# Patient Record
Sex: FEMALE | Race: ASIAN | NOT HISPANIC OR LATINO | Employment: FULL TIME | URBAN - METROPOLITAN AREA
[De-identification: names, ages, dates, MRNs, and addresses within clinical notes are randomized per-mention and may not be internally consistent; named-entity substitution may affect disease eponyms.]

---

## 2021-09-13 ENCOUNTER — OFFICE VISIT (OUTPATIENT)
Dept: MEDICAL GROUP | Age: 51
End: 2021-09-13
Payer: COMMERCIAL

## 2021-09-13 VITALS
BODY MASS INDEX: 23.73 KG/M2 | TEMPERATURE: 97.9 F | OXYGEN SATURATION: 97 % | DIASTOLIC BLOOD PRESSURE: 62 MMHG | WEIGHT: 139 LBS | HEART RATE: 67 BPM | SYSTOLIC BLOOD PRESSURE: 102 MMHG | HEIGHT: 64 IN

## 2021-09-13 DIAGNOSIS — Z23 NEED FOR VACCINATION: ICD-10-CM

## 2021-09-13 DIAGNOSIS — Z76.89 ENCOUNTER TO ESTABLISH CARE WITH NEW DOCTOR: ICD-10-CM

## 2021-09-13 DIAGNOSIS — E55.9 VITAMIN D INSUFFICIENCY: ICD-10-CM

## 2021-09-13 DIAGNOSIS — Z00.00 PE (PHYSICAL EXAM), ANNUAL: ICD-10-CM

## 2021-09-13 DIAGNOSIS — Z12.31 ENCOUNTER FOR SCREENING MAMMOGRAM FOR BREAST CANCER: ICD-10-CM

## 2021-09-13 DIAGNOSIS — Z83.3 FHX: DIABETES MELLITUS: ICD-10-CM

## 2021-09-13 DIAGNOSIS — N83.201 CYST OF RIGHT OVARY: ICD-10-CM

## 2021-09-13 DIAGNOSIS — Z12.12 SCREENING FOR COLORECTAL CANCER: ICD-10-CM

## 2021-09-13 DIAGNOSIS — Z12.11 SCREENING FOR COLORECTAL CANCER: ICD-10-CM

## 2021-09-13 DIAGNOSIS — M79.7 FIBROMYALGIA: ICD-10-CM

## 2021-09-13 PROCEDURE — 99386 PREV VISIT NEW AGE 40-64: CPT | Mod: 25 | Performed by: FAMILY MEDICINE

## 2021-09-13 PROCEDURE — 90715 TDAP VACCINE 7 YRS/> IM: CPT | Performed by: FAMILY MEDICINE

## 2021-09-13 PROCEDURE — 90471 IMMUNIZATION ADMIN: CPT | Performed by: FAMILY MEDICINE

## 2021-09-13 ASSESSMENT — PATIENT HEALTH QUESTIONNAIRE - PHQ9: CLINICAL INTERPRETATION OF PHQ2 SCORE: 0

## 2021-09-14 NOTE — PROGRESS NOTES
Subjective:   CC: establish care, annual PE     HPI:     Rory Barajas is a 51 y.o. female, established patient of the clinic.     Patient is healthy 51-year-old female with no major medical or surgical history.  Patient is , sexually active, has 1 adult son.  Patient works as a .  She does not take any medications.  Negative history of drugs, alcohol, tobacco abuse.  Patient is due for mammogram, Pap smear, colon cancer screening and a few vaccine.    Patient states that she will found to have right ovarian cyst in the past.  She does have intermittent pain at the right lower pelvic area without dyspareunia, abnormal vaginal bleeding/discharge, familial history of gynecologic malignancy.  Patient would like to have repeat ultrasound for reassessment.    Family history is positive for type 2 diabetes.  She is taking vitamin D supplement for vitamin D insufficiency.    She was diagnosed with fibromyalgia in the past and was treated with Cymbalta and Lyrica.  However, patient discontinued both medication for several years due to increased mood irritation, agitation, anger issue associated with medication.  Her symptoms are manageable with regular exercise and activity modification at this time.  She is also taking over-the-counter as needed for pain.    Current medicines (including changes today)  No current outpatient medications on file.     No current facility-administered medications for this visit.     She  has no past medical history on file.    I personally reviewed patient's problem list, allergies, medications, family hx, social hx with patient and update EPIC.     REVIEW OF SYSTEMS:  CONSTITUTIONAL:  Denies night sweats, fatigue, malaise, lethargy, fever or chills.  RESPIRATORY:  Denies cough, wheeze, hemoptysis, or shortness of breath.  CARDIOVASCULAR:  Denies chest pains, palpitations, pedal edema     Objective:     /62 (BP Location: Right arm, Patient Position: Sitting, BP Cuff Size:  "Adult)   Pulse 67   Temp 36.6 °C (97.9 °F) (Temporal)   Ht 1.626 m (5' 4\")   Wt 63 kg (139 lb)   SpO2 97%  Body mass index is 23.86 kg/m².    Physical Exam:  Constitutional: awake, alert, in no distress.  Skin: Warm, dry, good turgor, no rashes, bruises, ulcers in visible areas.  Eye: conjunctiva clear, lids neg for edema or lesions.  ENMT: TM and auditory canals wnl.    Neck: Trachea midline, no masses, no thyromegaly. No cervical or supraclavicular lymphadenopathy  Respiratory: Unlabored respiratory effort, lungs clear to auscultation, no wheezes, no rales.  Cardiovascular: Normal S1, S2, no murmur, no pedal edema.  Abdomen: Soft, non-tender to palpation, active BS, no hernia, no hepatosplenomegaly, negative rebound or guarding.   Psych: Oriented x3, affect and mood wnl, intact judgement and insight.       Assessment and Plan:   The following treatment plan was discussed    1. Cyst of right ovary  - US-PELVIC TRANSVAGINAL ONLY; Future    2. Fibromyalgia  Chronic, controlled with behavioral modification and over-the-counter analgesic as needed for pain.  Historically, she was treated with Cymbalta and Lyrica.  Unfortunately she had to discontinue both medication due to increasing mental side effect.  -Exercises, stress reduction, over-the-counter analgesics as needed for pain    3. FHx: diabetes mellitus  We will screen for diabetes    4. Vitamin D insufficiency  -Continue 2000 units of vitamin D daily  - VITAMIN D,25 HYDROXY; Future    5. Need for vaccination  - Tdap Vaccine =>6YO IM    6. Encounter for screening mammogram for breast cancer  - MA-SCREENING MAMMO BILAT W/CAD; Future    7. Screening for colorectal cancer  - REFERRAL TO GI FOR COLONOSCOPY    8. PE (physical exam), annual  9. Encounter to establish care with new doctor  General health and wellness counseling provided.      Vaccines counseling provided  - CBC WITH DIFFERENTIAL; Future  - Comp Metabolic Panel; Future  - HEMOGLOBIN A1C; Future  - " Lipid Profile; Future      Ly YESENIA Foster M.D.      Followup: Return for Pap, Labs review.    Please note that this dictation was created using voice recognition software. I have made every reasonable attempt to correct obvious errors, but I expect that there are errors of grammar and possibly content that I did not discover before finalizing the note.

## 2021-11-08 ENCOUNTER — APPOINTMENT (OUTPATIENT)
Dept: MEDICAL GROUP | Age: 51
End: 2021-11-08
Payer: COMMERCIAL

## 2021-11-29 ENCOUNTER — HOSPITAL ENCOUNTER (OUTPATIENT)
Dept: RADIOLOGY | Facility: MEDICAL CENTER | Age: 51
End: 2021-11-29
Attending: FAMILY MEDICINE
Payer: COMMERCIAL

## 2021-11-29 ENCOUNTER — HOSPITAL ENCOUNTER (OUTPATIENT)
Dept: LAB | Facility: MEDICAL CENTER | Age: 51
End: 2021-11-29
Attending: FAMILY MEDICINE
Payer: COMMERCIAL

## 2021-11-29 DIAGNOSIS — Z00.00 PE (PHYSICAL EXAM), ANNUAL: ICD-10-CM

## 2021-11-29 DIAGNOSIS — E55.9 VITAMIN D INSUFFICIENCY: ICD-10-CM

## 2021-11-29 DIAGNOSIS — Z12.31 ENCOUNTER FOR SCREENING MAMMOGRAM FOR BREAST CANCER: ICD-10-CM

## 2021-11-29 DIAGNOSIS — N83.201 CYST OF RIGHT OVARY: ICD-10-CM

## 2021-11-29 LAB
ALBUMIN SERPL BCP-MCNC: 4.6 G/DL (ref 3.2–4.9)
ALBUMIN/GLOB SERPL: 1.5 G/DL
ALP SERPL-CCNC: 95 U/L (ref 30–99)
ALT SERPL-CCNC: 19 U/L (ref 2–50)
ANION GAP SERPL CALC-SCNC: 9 MMOL/L (ref 7–16)
AST SERPL-CCNC: 17 U/L (ref 12–45)
BASOPHILS # BLD AUTO: 0.3 % (ref 0–1.8)
BASOPHILS # BLD: 0.02 K/UL (ref 0–0.12)
BILIRUB SERPL-MCNC: 0.6 MG/DL (ref 0.1–1.5)
BUN SERPL-MCNC: 12 MG/DL (ref 8–22)
CALCIUM SERPL-MCNC: 9.7 MG/DL (ref 8.5–10.5)
CHLORIDE SERPL-SCNC: 106 MMOL/L (ref 96–112)
CHOLEST SERPL-MCNC: 265 MG/DL (ref 100–199)
CO2 SERPL-SCNC: 26 MMOL/L (ref 20–33)
CREAT SERPL-MCNC: 0.64 MG/DL (ref 0.5–1.4)
EOSINOPHIL # BLD AUTO: 0.11 K/UL (ref 0–0.51)
EOSINOPHIL NFR BLD: 1.5 % (ref 0–6.9)
ERYTHROCYTE [DISTWIDTH] IN BLOOD BY AUTOMATED COUNT: 50.3 FL (ref 35.9–50)
EST. AVERAGE GLUCOSE BLD GHB EST-MCNC: 134 MG/DL
FASTING STATUS PATIENT QL REPORTED: NORMAL
GLOBULIN SER CALC-MCNC: 3 G/DL (ref 1.9–3.5)
GLUCOSE SERPL-MCNC: 111 MG/DL (ref 65–99)
HBA1C MFR BLD: 6.3 % (ref 4–5.6)
HCT VFR BLD AUTO: 43.9 % (ref 37–47)
HDLC SERPL-MCNC: 87 MG/DL
HGB BLD-MCNC: 13.8 G/DL (ref 12–16)
IMM GRANULOCYTES # BLD AUTO: 0.01 K/UL (ref 0–0.11)
IMM GRANULOCYTES NFR BLD AUTO: 0.1 % (ref 0–0.9)
LDLC SERPL CALC-MCNC: 167 MG/DL
LYMPHOCYTES # BLD AUTO: 2.41 K/UL (ref 1–4.8)
LYMPHOCYTES NFR BLD: 32.3 % (ref 22–41)
MCH RBC QN AUTO: 26.4 PG (ref 27–33)
MCHC RBC AUTO-ENTMCNC: 31.4 G/DL (ref 33.6–35)
MCV RBC AUTO: 84.1 FL (ref 81.4–97.8)
MONOCYTES # BLD AUTO: 0.35 K/UL (ref 0–0.85)
MONOCYTES NFR BLD AUTO: 4.7 % (ref 0–13.4)
NEUTROPHILS # BLD AUTO: 4.57 K/UL (ref 2–7.15)
NEUTROPHILS NFR BLD: 61.1 % (ref 44–72)
NRBC # BLD AUTO: 0 K/UL
NRBC BLD-RTO: 0 /100 WBC
PLATELET # BLD AUTO: 290 K/UL (ref 164–446)
PMV BLD AUTO: 9.5 FL (ref 9–12.9)
POTASSIUM SERPL-SCNC: 4.5 MMOL/L (ref 3.6–5.5)
PROT SERPL-MCNC: 7.6 G/DL (ref 6–8.2)
RBC # BLD AUTO: 5.22 M/UL (ref 4.2–5.4)
SODIUM SERPL-SCNC: 141 MMOL/L (ref 135–145)
TRIGL SERPL-MCNC: 55 MG/DL (ref 0–149)
WBC # BLD AUTO: 7.5 K/UL (ref 4.8–10.8)

## 2021-11-29 PROCEDURE — 82306 VITAMIN D 25 HYDROXY: CPT

## 2021-11-29 PROCEDURE — 80053 COMPREHEN METABOLIC PANEL: CPT

## 2021-11-29 PROCEDURE — 85025 COMPLETE CBC W/AUTO DIFF WBC: CPT

## 2021-11-29 PROCEDURE — 77063 BREAST TOMOSYNTHESIS BI: CPT

## 2021-11-29 PROCEDURE — 76830 TRANSVAGINAL US NON-OB: CPT

## 2021-11-29 PROCEDURE — 80061 LIPID PANEL: CPT

## 2021-11-29 PROCEDURE — 36415 COLL VENOUS BLD VENIPUNCTURE: CPT

## 2021-11-29 PROCEDURE — 83036 HEMOGLOBIN GLYCOSYLATED A1C: CPT

## 2021-12-01 LAB — 25(OH)D3 SERPL-MCNC: 18 NG/ML (ref 30–80)

## 2021-12-06 ENCOUNTER — OFFICE VISIT (OUTPATIENT)
Dept: MEDICAL GROUP | Age: 51
End: 2021-12-06
Payer: COMMERCIAL

## 2021-12-06 VITALS
OXYGEN SATURATION: 96 % | WEIGHT: 146 LBS | HEIGHT: 63 IN | TEMPERATURE: 97.1 F | DIASTOLIC BLOOD PRESSURE: 60 MMHG | BODY MASS INDEX: 25.87 KG/M2 | SYSTOLIC BLOOD PRESSURE: 100 MMHG | HEART RATE: 66 BPM | RESPIRATION RATE: 12 BRPM

## 2021-12-06 DIAGNOSIS — E55.9 VITAMIN D DEFICIENCY: ICD-10-CM

## 2021-12-06 DIAGNOSIS — E78.00 PURE HYPERCHOLESTEROLEMIA: ICD-10-CM

## 2021-12-06 DIAGNOSIS — Z01.83 ENCOUNTER FOR BLOOD TYPING: ICD-10-CM

## 2021-12-06 DIAGNOSIS — R73.03 PREDIABETES: ICD-10-CM

## 2021-12-06 PROCEDURE — 99214 OFFICE O/P EST MOD 30 MIN: CPT | Performed by: FAMILY MEDICINE

## 2021-12-06 ASSESSMENT — FIBROSIS 4 INDEX: FIB4 SCORE: 0.69

## 2021-12-06 NOTE — PROGRESS NOTES
"Subjective:   CC: hyperlipidemia     HPI:     Rory Barajas is a 51 y.o. female, established patient of the clinic.     Patient is overall healthy, no major medical or surgical history.  Her most recent labs showed A1c of 6.3 and elevated total cholesterol and LDL.  She was also found to have vitamin D deficiency.  She currently does not take any medication including supplements or vitamins.  Patient states that her diet is generally poor. She consumes red meat, eggs, and sweets regularly.  She is active, but does not exercise regularly.  Patient has been gaining more weight as she is getting older.  Negative familial history of diabetes or hyperlipidemia.    Current medicines (including changes today)  No current outpatient medications on file.     No current facility-administered medications for this visit.     She  has no past medical history on file.    I reviewed patient's problem list, allergies, medications, family hx, social hx with patient and update EPIC.        Objective:     /60 (BP Location: Left arm, Patient Position: Sitting, BP Cuff Size: Adult)   Pulse 66   Temp 36.2 °C (97.1 °F) (Temporal)   Resp 12   Ht 1.6 m (5' 3\")   Wt 66.2 kg (146 lb)   SpO2 96%  Body mass index is 25.86 kg/m².    Physical Exam:  Constitutional: awake, alert, in no distress.  Skin: Warm, dry, good turgor, no rashes, bruises, ulcers in visible areas.  Eye: conjunctiva clear, lids neg for edema or lesions.  Neck: Trachea midline, no masses, no thyromegaly. No cervical or supraclavicular lymphadenopathy  Respiratory: Unlabored respiratory effort, lungs clear to auscultation, no wheezes, no rales.  Cardiovascular: Normal S1, S2, no murmur, no pedal edema.  Psych: Oriented x3, affect and mood wnl, intact judgement and insight.       Assessment and Plan:   The following treatment plan was discussed    1. Pure hypercholesterolemia  The 10-year ASCVD risk score (Kaylee DC Jr., et al., 2013) is: 0.7%    Values used to calculate the " score:      Age: 51 years      Sex: Female      Is Non- : No      Diabetic: No      Tobacco smoker: No      Systolic Blood Pressure: 100 mmHg      Is BP treated: No      HDL Cholesterol: 87 mg/dL      Total Cholesterol: 265 mg/dL   - dietary modification, exercise, and weight loss.   - avoid alcohol, drugs, tobacco products   - Lipid Profile; Future  - Comp Metabolic Panel; Future  - follow up in 6 months     2. Prediabetes  Most recent A1C was 6.3.   - Dietary/lifestyle modification and weight loss    - HEMOGLOBIN A1C; Future    3. Encounter for blood typing  - ABO GROUPING (ONLY); Future    4. Vitamin D deficiency  - 5000 units of vitamin D daily   - VITAMIN D,25 HYDROXY; Future      Ly YESENIA Foster M.D.      Followup: Return in about 6 months (around 6/6/2022) for Pap.    Please note that this dictation was created using voice recognition software. I have made every reasonable attempt to correct obvious errors, but I expect that there are errors of grammar and possibly content that I did not discover before finalizing the note.

## 2021-12-07 ENCOUNTER — TELEPHONE (OUTPATIENT)
Dept: MEDICAL GROUP | Age: 51
End: 2021-12-07

## 2021-12-07 DIAGNOSIS — Z01.83 ENCOUNTER FOR BLOOD TYPING: ICD-10-CM

## 2021-12-08 NOTE — TELEPHONE ENCOUNTER
Patient can have ABO test done next year with the rest of her labs. She was previously informed.   Chey Foster M.D.

## 2022-05-24 LAB
ALBUMIN SERPL-MCNC: 4.7 G/DL (ref 3.8–4.9)
ALBUMIN/GLOB SERPL: 2 {RATIO} (ref 1.2–2.2)
ALP SERPL-CCNC: 57 IU/L (ref 44–121)
ALT SERPL-CCNC: 12 IU/L (ref 0–32)
AST SERPL-CCNC: 17 IU/L (ref 0–40)
BILIRUB SERPL-MCNC: 0.6 MG/DL (ref 0–1.2)
BUN SERPL-MCNC: 9 MG/DL (ref 6–24)
BUN/CREAT SERPL: 12 (ref 9–23)
CALCIUM SERPL-MCNC: 9.5 MG/DL (ref 8.7–10.2)
CHLORIDE SERPL-SCNC: 105 MMOL/L (ref 96–106)
CHOLEST SERPL-MCNC: 233 MG/DL (ref 100–199)
CO2 SERPL-SCNC: 22 MMOL/L (ref 20–29)
CREAT SERPL-MCNC: 0.75 MG/DL (ref 0.57–1)
EGFRCR SERPLBLD CKD-EPI 2021: 96 ML/MIN/1.73
GLOBULIN SER CALC-MCNC: 2.4 G/DL (ref 1.5–4.5)
GLUCOSE SERPL-MCNC: 93 MG/DL (ref 65–99)
HBA1C MFR BLD: 6 % (ref 4.8–5.6)
HDLC SERPL-MCNC: 78 MG/DL
LABORATORY COMMENT REPORT: ABNORMAL
LDLC SERPL CALC-MCNC: 146 MG/DL (ref 0–99)
POTASSIUM SERPL-SCNC: 4.2 MMOL/L (ref 3.5–5.2)
PROT SERPL-MCNC: 7.1 G/DL (ref 6–8.5)
SODIUM SERPL-SCNC: 143 MMOL/L (ref 134–144)
TRIGL SERPL-MCNC: 53 MG/DL (ref 0–149)
VLDLC SERPL CALC-MCNC: 9 MG/DL (ref 5–40)

## 2022-06-06 ENCOUNTER — OFFICE VISIT (OUTPATIENT)
Dept: MEDICAL GROUP | Age: 52
End: 2022-06-06
Payer: COMMERCIAL

## 2022-06-06 VITALS
HEIGHT: 63 IN | HEART RATE: 74 BPM | SYSTOLIC BLOOD PRESSURE: 100 MMHG | DIASTOLIC BLOOD PRESSURE: 64 MMHG | BODY MASS INDEX: 25.16 KG/M2 | OXYGEN SATURATION: 97 % | TEMPERATURE: 98 F | RESPIRATION RATE: 15 BRPM | WEIGHT: 142 LBS

## 2022-06-06 DIAGNOSIS — Z12.12 SCREENING FOR COLORECTAL CANCER: ICD-10-CM

## 2022-06-06 DIAGNOSIS — Z00.00 PE (PHYSICAL EXAM), ANNUAL: Primary | ICD-10-CM

## 2022-06-06 DIAGNOSIS — Z12.11 SCREENING FOR COLORECTAL CANCER: ICD-10-CM

## 2022-06-06 DIAGNOSIS — E78.00 PURE HYPERCHOLESTEROLEMIA: ICD-10-CM

## 2022-06-06 DIAGNOSIS — R73.03 PREDIABETES: ICD-10-CM

## 2022-06-06 PROCEDURE — 99396 PREV VISIT EST AGE 40-64: CPT | Performed by: FAMILY MEDICINE

## 2022-06-06 ASSESSMENT — PATIENT HEALTH QUESTIONNAIRE - PHQ9: CLINICAL INTERPRETATION OF PHQ2 SCORE: 0

## 2022-06-06 ASSESSMENT — FIBROSIS 4 INDEX: FIB4 SCORE: 0.88

## 2022-06-06 NOTE — PROGRESS NOTES
"Subjective:   CC: Annual physical    HPI:     Rory Barajsa is a 52 y.o. female, established patient of the clinic.     Patient has chronic prediabetes, hyperlipidemia.  She is not taking medication for these conditions.  She has been working on dietary and lifestyle modification.  She lost about 45 pounds in last office visit.  Her most recent labs showed A1c improvement from 6.3 to 6.0.  Her lipid profile is also slightly improved, but total cholesterol and LDL remain elevated.  She is active but does not exercise regularly.  Negative history of proximal, alcohol, tobacco abuse.    Current medicines (including changes today)  No current outpatient medications on file.     No current facility-administered medications for this visit.     She  has no past medical history on file.    I reviewed patient's problem list, allergies, medications, family hx, social hx with patient and update EPIC.        Objective:     /64 (BP Location: Right arm, Patient Position: Sitting, BP Cuff Size: Adult)   Pulse 74   Temp 36.7 °C (98 °F) (Temporal)   Resp 15   Ht 1.6 m (5' 3\")   Wt 64.4 kg (142 lb)   SpO2 97%  Body mass index is 25.15 kg/m².    Physical Exam:  Constitutional: awake, alert, in no distress.  Skin: Warm, dry, good turgor, no rashes, bruises, ulcers in visible areas.  Eye: conjunctiva clear, lids neg for edema or lesions.  Neck: Trachea midline, no masses, no thyromegaly. No cervical or supraclavicular lymphadenopathy  Respiratory: Unlabored respiratory effort, lungs clear to auscultation, no wheezes, no rales.  Cardiovascular: Normal S1, S2, no murmur, no pedal edema.  Psych: Oriented x3, affect and mood wnl, intact judgement and insight.       Assessment and Plan:   The following treatment plan was discussed    1. Pure hypercholesterolemia  The 10-year ASCVD risk score (Kaylee DC Jr., et al., 2013) is: 0.7%    Values used to calculate the score:      Age: 52 years      Sex: Female      Is Non-  " American: No      Diabetic: No      Tobacco smoker: No      Systolic Blood Pressure: 100 mmHg      Is BP treated: No      HDL Cholesterol: 78 mg/dL      Total Cholesterol: 233 mg/dL   - dietary modification, exercise, and weight loss.   - avoid alcohol, drugs, tobacco products   - Lipid Profile; Future    2. Prediabetes  A1C improves from 6.3 to 6.0 per most recent labs.   - dietary modification, exercise, and weight loss.   - avoid alcohol, drugs, tobacco products   - HEMOGLOBIN A1C; Future    3. Screening for colorectal cancer  - COLOGUARD (FIT DNA)    4. PE (physical exam), annual  General health and wellness counseling provided.     Patient is due for second shingles vaccine.  She will get this vaccine at local pharmacies.  - CBC WITH DIFFERENTIAL; Future  - Comp Metabolic Panel; Future  - HEMOGLOBIN A1C; Future  - Lipid Profile; Future      Ly YESENIA Foster M.D.      Followup: Return in about 6 months (around 12/6/2022) for Pap, Multiple issues.    Please note that this dictation was created using voice recognition software. I have made every reasonable attempt to correct obvious errors, but I expect that there are errors of grammar and possibly content that I did not discover before finalizing the note.

## 2022-08-11 ENCOUNTER — OFFICE VISIT (OUTPATIENT)
Dept: MEDICAL GROUP | Age: 52
End: 2022-08-11
Payer: COMMERCIAL

## 2022-08-11 VITALS
BODY MASS INDEX: 24.8 KG/M2 | HEIGHT: 63 IN | OXYGEN SATURATION: 97 % | DIASTOLIC BLOOD PRESSURE: 62 MMHG | TEMPERATURE: 97.3 F | WEIGHT: 140 LBS | HEART RATE: 56 BPM | SYSTOLIC BLOOD PRESSURE: 104 MMHG

## 2022-08-11 DIAGNOSIS — Z71.84 COUNSELING ABOUT TRAVEL: ICD-10-CM

## 2022-08-11 DIAGNOSIS — Z01.84 IMMUNITY STATUS TESTING: ICD-10-CM

## 2022-08-11 PROCEDURE — 99213 OFFICE O/P EST LOW 20 MIN: CPT | Performed by: FAMILY MEDICINE

## 2022-08-11 ASSESSMENT — FIBROSIS 4 INDEX: FIB4 SCORE: 0.88

## 2022-08-12 NOTE — PROGRESS NOTES
"Subjective:   CC: Travel counseling    HPI:     Rory Barajas is a 52 y.o. female, established patient of the clinic.     Patient is a healthy 52-year-old female with no major medical or surgical history.  She was previously diagnosed with mild hyperlipidemia, prediabetes, fibromyalgia.  She is working on dietary and lifestyle modification to improve chronic medical condition.  Patient currently does not take any medications.  She will be visiting her mother in [place] in September 2022.  Patient present today for travel counseling as well as immunization.  She is feeling well.  She does not have any acute concerns.    Current medicines (including changes today)  Current Outpatient Medications   Medication Sig Dispense Refill    Nirmatrelvir & Ritonavir 20 x 150 MG & 10 x 100MG Tablet Therapy Pack Take 300 mg nirmatrelvir (two 150 mg tablets) with 100 mg ritonavir (one 100 mg tablet) by mouth, with all three tablets taken together twice daily for 5 days. 30 Each 0     No current facility-administered medications for this visit.     She  has no past medical history on file.    I reviewed patient's problem list, allergies, medications, family hx, social hx with patient and update EPIC.        Objective:     /62 (BP Location: Right arm, Patient Position: Sitting, BP Cuff Size: Adult)   Pulse (!) 56   Temp 36.3 °C (97.3 °F) (Temporal)   Ht 1.6 m (5' 3\")   Wt 63.5 kg (140 lb)   SpO2 97%  Body mass index is 24.8 kg/m².    Physical Exam:  Constitutional: awake, alert, in no distress.  Skin: Warm, dry, good turgor, no rashes, bruises, ulcers in visible areas.  Eye: conjunctiva clear, lids neg for edema or lesions.  Respiratory: Unlabored respiratory effort, lungs clear to auscultation, no wheezes, no rales.  Cardiovascular: Normal S1, S2, no murmur, no pedal edema.  Psych: Oriented x3, affect and mood wnl, intact judgement and insight.       Assessment and Plan:   The following treatment plan was discussed    1. " Immunity status testing  2. Counseling about travel  Healthy 52-year-old female with no major medical or surgical history.  Patient is traveling to [place] in September 2022 to visit her mother.  I reviewed CDC recommendation with patient.  She does not have record MMR, varicella, hepatitis B.    - MEASLES/MUMPS/RUBELLA IMMUNITY; Future  - VARICELLA ZOSTER IGG AB; Future  - HEP B SURFACE AB; Future  - Rx for Paxlovid provided for acute COVID-19 infection during the trip: Nirmatrelvir & Ritonavir 20 x 150 MG & 10 x 100MG Tablet Therapy Pack; Take 300 mg nirmatrelvir (two 150 mg tablets) with 100 mg ritonavir (one 100 mg tablet) by mouth, with all three tablets taken together twice daily for 5 days.  Dispense: 30 Each; Refill: 0  - Recommended second COVID-19 booster before the trip.      Chey Foster M.D.      Followup: Return for Pap.    Please note that this dictation was created using voice recognition software. I have made every reasonable attempt to correct obvious errors, but I expect that there are errors of grammar and possibly content that I did not discover before finalizing the note.

## 2022-08-13 LAB
HBV SURFACE AB SER QL: REACTIVE
MEV IGG SER IA-ACNC: >300 AU/ML
MUV IGG SER IA-ACNC: 175 AU/ML
RUBV IGG SERPL IA-ACNC: 1.9 INDEX
VZV IGG SER IA-ACNC: >4000 INDEX

## 2022-12-05 ENCOUNTER — HOSPITAL ENCOUNTER (OUTPATIENT)
Facility: MEDICAL CENTER | Age: 52
End: 2022-12-05
Attending: FAMILY MEDICINE
Payer: COMMERCIAL

## 2022-12-05 ENCOUNTER — OFFICE VISIT (OUTPATIENT)
Dept: MEDICAL GROUP | Age: 52
End: 2022-12-05
Payer: COMMERCIAL

## 2022-12-05 VITALS
SYSTOLIC BLOOD PRESSURE: 106 MMHG | DIASTOLIC BLOOD PRESSURE: 60 MMHG | OXYGEN SATURATION: 98 % | WEIGHT: 138 LBS | HEIGHT: 63 IN | BODY MASS INDEX: 24.45 KG/M2 | TEMPERATURE: 96.8 F | HEART RATE: 66 BPM

## 2022-12-05 DIAGNOSIS — Z01.419 ENCOUNTER FOR WELL WOMAN EXAM WITH ROUTINE GYNECOLOGICAL EXAM: ICD-10-CM

## 2022-12-05 DIAGNOSIS — Z11.51 SPECIAL SCREENING EXAMINATION FOR HUMAN PAPILLOMAVIRUS (HPV): ICD-10-CM

## 2022-12-05 DIAGNOSIS — R73.03 PREDIABETES: ICD-10-CM

## 2022-12-05 DIAGNOSIS — Z82.3 FHX: STROKE: ICD-10-CM

## 2022-12-05 DIAGNOSIS — Z11.59 NEED FOR HEPATITIS C SCREENING TEST: ICD-10-CM

## 2022-12-05 DIAGNOSIS — Z01.419 WELL WOMAN EXAM: Primary | ICD-10-CM

## 2022-12-05 DIAGNOSIS — Z23 NEED FOR VACCINATION: ICD-10-CM

## 2022-12-05 DIAGNOSIS — E55.9 VITAMIN D INSUFFICIENCY: ICD-10-CM

## 2022-12-05 DIAGNOSIS — N39.3 STRESS INCONTINENCE, FEMALE: ICD-10-CM

## 2022-12-05 DIAGNOSIS — E78.00 PURE HYPERCHOLESTEROLEMIA: ICD-10-CM

## 2022-12-05 DIAGNOSIS — Z12.4 ROUTINE CERVICAL SMEAR: ICD-10-CM

## 2022-12-05 PROCEDURE — 99214 OFFICE O/P EST MOD 30 MIN: CPT | Mod: 25 | Performed by: FAMILY MEDICINE

## 2022-12-05 PROCEDURE — 88175 CYTOPATH C/V AUTO FLUID REDO: CPT

## 2022-12-05 PROCEDURE — 99396 PREV VISIT EST AGE 40-64: CPT | Performed by: FAMILY MEDICINE

## 2022-12-05 PROCEDURE — 87624 HPV HI-RISK TYP POOLED RSLT: CPT

## 2022-12-05 RX ORDER — ROSUVASTATIN CALCIUM 10 MG/1
10 TABLET, COATED ORAL EVERY EVENING
Qty: 90 TABLET | Refills: 1 | Status: SHIPPED | OUTPATIENT
Start: 2022-12-05 | End: 2023-07-17

## 2022-12-05 ASSESSMENT — FIBROSIS 4 INDEX: FIB4 SCORE: 0.88

## 2022-12-05 NOTE — PROGRESS NOTES
"Subjective:   CC: WWE, pap    HPI:     Rory Barajas is a 52 y.o. female, established patient of the clinic.     , sexually active with male partner.   Contraception:  has vasectomy   Hx of STD: negative   Hx of abnormal pap: negative   Patient's last menstrual period was 10/01/2022 (approximate)., regular, normal flow, minimal cramping.   Denies fever, chills, pelvic pain, dyspareunia, abnormal vaginal bleeding/discharge, genital rash.   Denies nipple bleeding, discharge, breast mass, familial/personal hx of breast/gyn malignancy.   Last mammogram: 2021, Finding: Unremarkable    Patient also has chronic prediabetes, hyperlipidemia.  Recent blood tests also show vitamin D insufficiency.  She currently does not take any prescribed medication.  However, she is interested in statin due to family history CVA in multiple different family members.  She also complains of urinary incontinence with sneezing coughing.  Symptoms are not bothersome to patient.  She wears panty liner.   Negative urinary frequency/urgency, dysuria, hematuria.    Current medicines (including changes today)  Current Outpatient Medications   Medication Sig Dispense Refill    rosuvastatin (CRESTOR) 10 MG Tab Take 1 Tablet by mouth every evening. 90 Tablet 1     No current facility-administered medications for this visit.     She  has no past medical history on file.    I reviewed patient's problem list, allergies, medications, family hx, social hx with patient and update EPIC.        Objective:     /60 (BP Location: Right arm, Patient Position: Sitting, BP Cuff Size: Adult)   Pulse 66   Temp 36 °C (96.8 °F) (Temporal)   Ht 1.6 m (5' 3\")   Wt 62.6 kg (138 lb)   SpO2 98%  Body mass index is 24.45 kg/m².    Physical Exam:  Constitutional: awake, alert, in no distress.  Skin: Warm, dry, good turgor, no rashes, bruises, ulcers in visible areas.  Eye: conjunctiva clear, lids neg for edema or lesions.  Neck: Trachea midline, no masses, no " thyromegaly. No cervical or supraclavicular lymphadenopathy  Respiratory: Unlabored respiratory effort, lungs clear to auscultation, no wheezes, no rales.  Cardiovascular: Normal S1, S2, no murmur, no pedal edema.  Abdomen: Soft, non-tender to palpation, active BS, no hernia, no hepatosplenomegaly, negative rebound or guarding.   Psych: Oriented x3, affect and mood wnl, intact judgement and insight.   GYN: Unremarkable external genitalia. Negative abnormal vaginal discharge or bleeding, no vaginal rash, cervix in mid position, no concerning lesions on cervix, no cervical motion tenderness, uterus mid position with normal size & shape, no adnexal fullness/mass appreciated on bimanual exam.     Assessment and Plan:   The following treatment plan was discussed    1. Routine cervical smear  - THINPREP PAP WITH HPV    2. Encounter for well woman exam with routine gynecological exam  - THINPREP PAP WITH HPV    3. Special screening examination for human papillomavirus (HPV)  - THINPREP PAP WITH HPV    4. Need for hepatitis C screening test  - HEP C VIRUS ANTIBODY; Future    5. Well woman exam  I reviewed PMH, social history, family history.   I reviewed and updated vaccines records. Vaccine counseling provided.   I reviewed and discussed all age-appropriate preventive cares.    General health/wellness and anticipatory guidance provided.      - CBC WITH DIFFERENTIAL; Future  - Comp Metabolic Panel; Future  - HEMOGLOBIN A1C; Future  - Lipid Profile; Future    6. Prediabetes  - Dietary/lifestyle modification and weight loss    - HEMOGLOBIN A1C; Future    7. Pure hypercholesterolemia  8. FHx: stroke  Chronic, The 10-year ASCVD risk score (Kristian PEREIRA, et al., 2019) is: 0.8%  However, given familial history of CVA and multiple first-degree family members, patient wishes to take statin for primary prevention of CVA.  - Lipid Profile; Future  - rosuvastatin (CRESTOR) 10 MG Tab; Take 1 Tablet by mouth every evening.  Dispense: 90  Tablet; Refill: 1  - dietary modification, exercise, and weight loss.   - avoid alcohol, drugs, tobacco products     9. Vitamin D deficiency  Previous labs was notable for vitamin D deficiency.  -Over-the-counter vitamin D3 supplement: 5000 units/day x 90 days  -Then transition to over-the-counter vitamin D supplement: 2000 units/day day after  - VITAMIN D,25 HYDROXY (DEFICIENCY); Future    10. Stress incontinence, female  History is concerning for stress incontinence.  -Kegel exercises, handout provided  -Follow up as needed    11. Need for vaccination  - INFLUENZA VACCINE QUAD INJ (PF)       Chey Foster M.D.      Followup: Return in about 6 months (around 6/5/2023) for annual PE.    Please note that this dictation was created using voice recognition software. I have made every reasonable attempt to correct obvious errors, but I expect that there are errors of grammar and possibly content that I did not discover before finalizing the note.

## 2022-12-07 LAB
CYTOLOGY REG CYTOL: ABNORMAL
HPV HR 12 DNA CVX QL NAA+PROBE: POSITIVE
HPV16 DNA SPEC QL NAA+PROBE: NEGATIVE
HPV18 DNA SPEC QL NAA+PROBE: NEGATIVE
SPECIMEN SOURCE: ABNORMAL

## 2022-12-09 DIAGNOSIS — R87.810 ASCUS WITH POSITIVE HIGH RISK HPV CERVICAL: ICD-10-CM

## 2022-12-09 DIAGNOSIS — R87.610 ASCUS WITH POSITIVE HIGH RISK HPV CERVICAL: ICD-10-CM

## 2022-12-16 ENCOUNTER — TELEPHONE (OUTPATIENT)
Dept: MEDICAL GROUP | Age: 52
End: 2022-12-16
Payer: COMMERCIAL

## 2022-12-16 NOTE — TELEPHONE ENCOUNTER
VOICEMAIL  1. Caller Name: Rory Barajas                      Call Back Number: [phone] (home)     2. Message: Patient left a voicemail asking that Dr. Foster call her because she is HPV positive.    3. Patient approves office to leave a detailed voicemail/MyChart message: no

## 2023-04-20 ENCOUNTER — OFFICE VISIT (OUTPATIENT)
Dept: URGENT CARE | Facility: CLINIC | Age: 53
End: 2023-04-20
Payer: COMMERCIAL

## 2023-04-20 VITALS
DIASTOLIC BLOOD PRESSURE: 60 MMHG | HEART RATE: 70 BPM | WEIGHT: 133.6 LBS | HEIGHT: 63 IN | SYSTOLIC BLOOD PRESSURE: 98 MMHG | BODY MASS INDEX: 23.67 KG/M2 | RESPIRATION RATE: 16 BRPM | TEMPERATURE: 97.7 F | OXYGEN SATURATION: 98 %

## 2023-04-20 DIAGNOSIS — G89.29 CHRONIC RIGHT SHOULDER PAIN: ICD-10-CM
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
## 2023-04-20 DIAGNOSIS — M25.511 CHRONIC RIGHT SHOULDER PAIN: ICD-10-CM

## 2023-04-20 PROCEDURE — 99214 OFFICE O/P EST MOD 30 MIN: CPT | Performed by: PHYSICIAN ASSISTANT

## 2023-04-20 RX ORDER — NAPROXEN 500 MG/1
500 TABLET ORAL 2 TIMES DAILY WITH MEALS
Qty: 60 TABLET | Refills: 0 | Status: SHIPPED
Start: 2023-04-20 | End: 2023-04-27

## 2023-04-20 ASSESSMENT — FIBROSIS 4 INDEX: FIB4 SCORE: 0.9

## 2023-04-21 ENCOUNTER — NON-PROVIDER VISIT (OUTPATIENT)
Dept: URGENT CARE | Facility: CLINIC | Age: 53
End: 2023-04-21
Payer: COMMERCIAL

## 2023-04-21 ENCOUNTER — APPOINTMENT (OUTPATIENT)
Dept: RADIOLOGY | Facility: IMAGING CENTER | Age: 53
End: 2023-04-21
Attending: PHYSICIAN ASSISTANT
Payer: COMMERCIAL

## 2023-04-21 DIAGNOSIS — G89.29 CHRONIC RIGHT SHOULDER PAIN: ICD-10-CM

## 2023-04-21 DIAGNOSIS — M25.511 CHRONIC RIGHT SHOULDER PAIN: ICD-10-CM
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
## 2023-04-21 PROCEDURE — 73030 X-RAY EXAM OF SHOULDER: CPT | Mod: TC,RT | Performed by: RADIOLOGY

## 2023-04-21 NOTE — PROGRESS NOTES
"Subjective:   Rory Barajas is a 53 y.o. female who presents for Shoulder Injury (X 2 months, right shoulder pain due to fall)      HPI  The patient presents to the Urgent Care with complaints of chronic right shoulder pain onset 8 months ago s/p ground-level fall.  She suffered a mechanical fall by slipping and falling to her right arm and elbow.  She bruised her elbow and was swollen but her elbow pain resolved shortly afterwards.  She still has had waxing waning and intermittent posterior right shoulder pain worse with certain arm movements and internal rotation.  Occasional right arm numbness if sleeping on her right arm.  No tingling.  No weakness other than the pain.  Pain is primarily to her posterior and lateral aspect of her right shoulder. She has been taking Tylenol as needed. She has not tried NSAIDs. Also reports of chills for the last 3 nights.  No other symptoms.  She does receive cupping therapy to her back.  Denies any recorded fever, cough, sore throat, chest pain, SOB, vomiting, diarrhea.       Medications:    rosuvastatin Tabs    Allergies: Patient has no known allergies.    Problem List: Rory Barajas does not have any pertinent problems on file.    Surgical History:  Past Surgical History:   Procedure Laterality Date    KNEE ARTHROSCOPY Right     PRIMARY C SECTION      TONSILLECTOMY         Past Social Hx: Rory Barajas  reports that she has never smoked. She has never used smokeless tobacco. She reports that she does not drink alcohol and does not use drugs.     Past Family Hx:  Rory Barajas family history includes Diabetes in her mother; Hypertension in her mother; No Known Problems in her father and sister.     Problem list, medications, and allergies reviewed by myself today in Epic.     Objective:     BP 98/60   Pulse 70   Temp 36.5 °C (97.7 °F) (Temporal)   Resp 16   Ht 1.6 m (5' 3\")   Wt 60.6 kg (133 lb 9.6 oz)   SpO2 98%   BMI 23.67 kg/m²     Physical Exam  Vitals reviewed.   Constitutional:       General: " She is not in acute distress.     Appearance: Normal appearance. She is not ill-appearing or toxic-appearing.   HENT:      Mouth/Throat:      Mouth: Mucous membranes are moist.      Pharynx: Oropharynx is clear.   Eyes:      Conjunctiva/sclera: Conjunctivae normal.      Pupils: Pupils are equal, round, and reactive to light.   Cardiovascular:      Rate and Rhythm: Normal rate and regular rhythm.      Heart sounds: Normal heart sounds.   Pulmonary:      Effort: Pulmonary effort is normal.      Breath sounds: Normal breath sounds.   Musculoskeletal:      Right shoulder: Tenderness (posterior shoulder and over musculature of spine of scapula) present. No swelling, deformity or crepitus. Normal range of motion. Normal strength.      Right upper arm: Normal.      Right elbow: Normal.      Right wrist: Normal pulse.      Right hand: Normal capillary refill. Normal pulse.      Cervical back: Neck supple. No rigidity.      Comments: Positive empty cans right shoulder    Skin:     General: Skin is warm and dry.   Neurological:      General: No focal deficit present.      Mental Status: She is alert and oriented to person, place, and time.   Psychiatric:         Mood and Affect: Mood normal.         Behavior: Behavior normal.       RADIOLOGY RESULTS   DX-SHOULDER 2+ RIGHT    Result Date: 4/21/2023 4/21/2023 10:11 AM HISTORY/REASON FOR EXAM:  Pain/Deformity Following Trauma; fall 8 months ago. TECHNIQUE/EXAM DESCRIPTION AND NUMBER OF VIEWS:  3 views of the right shoulder . COMPARISON: None FINDINGS: There is normal bony mineralization of the shoulder. There is no evidence of fracture, dislocation, or osseous lesion. The acromioclavicular joint is intact.     1.  Normal shoulder series.         Diagnosis and associated orders:     1. Chronic right shoulder pain  - naproxen (NAPROSYN) 500 MG Tab; Take 1 Tablet by mouth 2 times a day with meals.  Dispense: 60 Tablet; Refill: 0  - DX-SHOULDER 2+ RIGHT; Future  - Referral to  Sports Medicine    2. Fall from ground level  - DX-SHOULDER 2+ RIGHT; Future  - Referral to Sports Medicine       Comments/MDM:     RICE therapy. Naprosyn. Avoid other NSAIDs with Naprosyn. Gentle ROM exercises and stretching.   X-ray pending (no x-ray tonight at [facility]). Will call with x-ray results and discuss further decision making.       Spoke to patient on phone regarding x-ray results. Will refer to Sports Medicine for further evaluation and treatment.        I personally reviewed prior external notes and test results pertinent to today's visit. Pathogenesis of diagnosis discussed including typical length and natural progression. Supportive care, natural history, differential diagnoses, and indications for immediate follow-up discussed. Patient expresses understanding and agrees to plan. Patient denies any other questions or concerns.     Follow-up with the primary care physician for recheck, reevaluation, and consideration of further management.    Please note that this dictation was created using voice recognition software. I have made a reasonable attempt to correct obvious errors, but I expect that there are errors of grammar and possibly content that I did not discover before finalizing the note.    This note was electronically signed by Patrick Rush PA-C

## 2023-04-27 ENCOUNTER — OFFICE VISIT (OUTPATIENT)
Dept: MEDICAL GROUP | Age: 53
End: 2023-04-27
Payer: COMMERCIAL

## 2023-04-27 VITALS
HEART RATE: 58 BPM | WEIGHT: 129.4 LBS | DIASTOLIC BLOOD PRESSURE: 70 MMHG | HEIGHT: 63 IN | OXYGEN SATURATION: 98 % | SYSTOLIC BLOOD PRESSURE: 114 MMHG | BODY MASS INDEX: 22.93 KG/M2 | TEMPERATURE: 98.5 F

## 2023-04-27 DIAGNOSIS — E78.00 PURE HYPERCHOLESTEROLEMIA: ICD-10-CM

## 2023-04-27 DIAGNOSIS — S46.811A STRAIN OF RIGHT TRAPEZIUS MUSCLE, INITIAL ENCOUNTER: ICD-10-CM

## 2023-04-27 DIAGNOSIS — A04.8 H. PYLORI INFECTION: ICD-10-CM

## 2023-04-27 DIAGNOSIS — N39.3 STRESS INCONTINENCE, FEMALE: ICD-10-CM

## 2023-04-27 DIAGNOSIS — M25.511 ACUTE PAIN OF RIGHT SHOULDER: ICD-10-CM

## 2023-04-27 DIAGNOSIS — R10.13 EPIGASTRIC PAIN: ICD-10-CM

## 2023-04-27 DIAGNOSIS — R73.03 PREDIABETES: ICD-10-CM

## 2023-04-27 DIAGNOSIS — Z00.00 PE (PHYSICAL EXAM), ANNUAL: ICD-10-CM

## 2023-04-27 PROCEDURE — 99396 PREV VISIT EST AGE 40-64: CPT | Performed by: FAMILY MEDICINE

## 2023-04-27 PROCEDURE — 99214 OFFICE O/P EST MOD 30 MIN: CPT | Mod: 25 | Performed by: FAMILY MEDICINE

## 2023-04-27 RX ORDER — TIZANIDINE 4 MG/1
4 TABLET ORAL
Qty: 90 TABLET | Refills: 0 | Status: SHIPPED | OUTPATIENT
Start: 2023-04-27 | End: 2023-07-17

## 2023-04-27 ASSESSMENT — FIBROSIS 4 INDEX: FIB4 SCORE: 0.9

## 2023-04-27 ASSESSMENT — PATIENT HEALTH QUESTIONNAIRE - PHQ9: CLINICAL INTERPRETATION OF PHQ2 SCORE: 0

## 2023-04-27 NOTE — PROGRESS NOTES
Subjective:   CC: aPE, acute right shoulder pain, epigastric pain    HPI:     Rory Barajas is a 53 y.o. female, established patient of the clinic.     Patient has mild prediabetes, hyperlipidemia, mild stress incontinence.  Patient taking all medications as directed.  She tolerates them well, no side effect reported.  Patient has the following acute concerns:  Acute epigastric pain for 7 days.  Pain is described as mild.  Denies fever, chills, nausea, vomiting, hematochezia, melena, nocturnal symptoms, personal/familial history of GI disorders, recent changes in diet, unexplained weight loss.  Patient has been taking over-the-counter Tylenol and ibuprofen over the past few weeks for acute worsening chronic right shoulder pain.  Patient works for a local [employer].  Several employees there were recently diagnosed with H. pylori infection.  Patient states that she has been sharing foods with these employees in the past.  Patient complains of chronic right shoulder pain for quite some times.  Symptoms started after a ground-level fall onto the right elbow.  She recently reexperiences worsening right shoulder pain after lifting her suitcase to an overhead bin during her flight.  Right shoulder pain is described as sharp, mostly felt at the posterior shoulder, radiates to right arm.  Negative right arm paresthesia, and weakness, neck pain.  Patient continues to be able to function at work.  She works as a .  Has been taking ibuprofen     Current medicines (including changes today)  Current Outpatient Medications   Medication Sig Dispense Refill    tizanidine (ZANAFLEX) 4 MG Tab Take 1 Tablet by mouth at bedtime. 90 Tablet 0    rosuvastatin (CRESTOR) 10 MG Tab Take 1 Tablet by mouth every evening. 90 Tablet 1     No current facility-administered medications for this visit.     She  has no past medical history on file.    I reviewed patient's problem list, allergies, medications, family hx, social hx with patient  "and update EPIC.        Objective:     /70 (BP Location: Right arm, Patient Position: Sitting, BP Cuff Size: Adult)   Pulse (!) 58   Temp 36.9 °C (98.5 °F) (Temporal)   Ht 1.6 m (5' 3\")   Wt 58.7 kg (129 lb 6.4 oz)   SpO2 98%  Body mass index is 22.92 kg/m².    Physical Exam:  Constitutional: awake, alert, in no distress.  Skin: Warm, dry, good turgor, no rashes, bruises, ulcers in visible areas.  Eye: conjunctiva clear, lids neg for edema or lesions.  Neck: Trachea midline, no masses, no thyromegaly. No cervical or supraclavicular lymphadenopathy  Respiratory: Unlabored respiratory effort, lungs clear to auscultation, no wheezes, no rales.  Cardiovascular: Normal S1, S2, no murmur, no pedal edema.  Abdomen: Soft, non-tender to palpation, active BS, no hernia, no hepatosplenomegaly, negative rebound or guarding.   Psych: Oriented x3, affect and mood wnl, intact judgement and insight.   MSK:   R Shoulder exam:  No visible deformity or asymmetry. No joint effusion, bruises, hematoma. No crepitus.   Neuro: Normal sensation lateral shoulder and normal DTRs UE.  PROM: within normal limits    AROM: Pain with abduction  Apley’s scratch test: Asymmetrical, right worse than left  Cross-body test: Negative   Drop arm test: Negative   Empty can test: Negative  Infraspinatus/teres minor exam: Positive  Lift-off test: Negative  Neer’s impingement test: Negative .   Yergason’s test: Negative   Sulcus sign: Negative   Relocation: Positive         Assessment and Plan:   The following treatment plan was discussed    1. Prediabetes  Previous A1c was 6.0.  - Dietary/lifestyle modification and weight loss      2. Pure hypercholesterolemia  Chronic, currently taking Crestor 10 mg daily, no side effect reported, will continue.    3. Stress incontinence, female  Chronic, mild, controlled with Kegel exercises, will continue to monitor.    4. PE (physical exam), annual  Labs per orders  Immunization discussed  Patient was " counseled about skin care, diet, supplements, exercises.   Preventive cares reviewed, discussed, and updated as appropriate.       5. H. pylori exposure  6. Epigastric pain  Acute, likely secondary to NSAID consumption for acute right shoulder pain.  Patient was advised to discontinue NSAID and try Tylenol for pain instead.  Patient is concerns of possible H. pylori infection as she has been sharing foods with coworkers who were recently diagnosed with H. pylori infection.  - H. PYLORI, UREA BREATH TEST, ADULT; Future  - over-the-counter famotidine as needed    7. Acute pain of right shoulder  8. Strain of right trapezius muscle, initial encounter  - Referral to Physical Therapy  - tizanidine (ZANAFLEX) 4 MG Tab; Take 1 Tablet by mouth at bedtime.  Dispense: 90 Tablet; Refill: 0   - Tylenol PRN for pain, avoid NSAIDs due to epigastric symptoms  -  Activity modification, avoid further injury to the right shoulder  -Follow-up in 3 months    Chey Foster M.D.      Followup: Return in about 3 months (around 7/27/2023) for Multiple issues.    Please note that this dictation was created using voice recognition software. I have made every reasonable attempt to correct obvious errors, but I expect that there are errors of grammar and possibly content that I did not discover before finalizing the note.

## 2023-04-28 ENCOUNTER — TELEPHONE (OUTPATIENT)
Dept: HEALTH INFORMATION MANAGEMENT | Facility: OTHER | Age: 53
End: 2023-04-28
Payer: COMMERCIAL

## 2023-04-28 ENCOUNTER — HOSPITAL ENCOUNTER (OUTPATIENT)
Dept: LAB | Facility: MEDICAL CENTER | Age: 53
End: 2023-04-28
Attending: FAMILY MEDICINE
Payer: COMMERCIAL

## 2023-04-28 DIAGNOSIS — A04.8 H. PYLORI INFECTION: ICD-10-CM

## 2023-04-28 PROCEDURE — 83013 H PYLORI (C-13) BREATH: CPT

## 2023-04-29 LAB — UREA BREATH TEST QL: NEGATIVE

## 2023-07-15 DIAGNOSIS — E78.00 PURE HYPERCHOLESTEROLEMIA: ICD-10-CM

## 2023-07-15 DIAGNOSIS — S46.811A STRAIN OF RIGHT TRAPEZIUS MUSCLE, INITIAL ENCOUNTER: ICD-10-CM

## 2023-07-17 RX ORDER — TIZANIDINE 4 MG/1
TABLET ORAL
Qty: 90 TABLET | Refills: 0 | Status: SHIPPED | OUTPATIENT
Start: 2023-07-17 | End: 2023-08-15 | Stop reason: SDUPTHER

## 2023-07-17 RX ORDER — ROSUVASTATIN CALCIUM 10 MG/1
TABLET, COATED ORAL
Qty: 90 TABLET | Refills: 1 | Status: SHIPPED | OUTPATIENT
Start: 2023-07-17 | End: 2023-10-17 | Stop reason: SDUPTHER

## 2023-08-09 ENCOUNTER — HOSPITAL ENCOUNTER (OUTPATIENT)
Dept: LAB | Facility: MEDICAL CENTER | Age: 53
End: 2023-08-09
Attending: FAMILY MEDICINE
Payer: COMMERCIAL

## 2023-08-09 DIAGNOSIS — R73.03 PREDIABETES: ICD-10-CM

## 2023-08-09 DIAGNOSIS — Z11.59 NEED FOR HEPATITIS C SCREENING TEST: ICD-10-CM

## 2023-08-09 DIAGNOSIS — E78.00 PURE HYPERCHOLESTEROLEMIA: ICD-10-CM

## 2023-08-09 DIAGNOSIS — E55.9 VITAMIN D INSUFFICIENCY: ICD-10-CM

## 2023-08-09 DIAGNOSIS — Z01.419 WELL WOMAN EXAM: ICD-10-CM

## 2023-08-09 LAB
25(OH)D3 SERPL-MCNC: 34 NG/ML (ref 30–100)
ALBUMIN SERPL BCP-MCNC: 4.5 G/DL (ref 3.2–4.9)
ALBUMIN/GLOB SERPL: 1.7 G/DL
ALP SERPL-CCNC: 68 U/L (ref 30–99)
ALT SERPL-CCNC: 41 U/L (ref 2–50)
ANION GAP SERPL CALC-SCNC: 12 MMOL/L (ref 7–16)
AST SERPL-CCNC: 43 U/L (ref 12–45)
BASOPHILS # BLD AUTO: 0.4 % (ref 0–1.8)
BASOPHILS # BLD: 0.02 K/UL (ref 0–0.12)
BILIRUB SERPL-MCNC: 0.6 MG/DL (ref 0.1–1.5)
BUN SERPL-MCNC: 10 MG/DL (ref 8–22)
CALCIUM ALBUM COR SERPL-MCNC: 9.2 MG/DL (ref 8.5–10.5)
CALCIUM SERPL-MCNC: 9.6 MG/DL (ref 8.5–10.5)
CHLORIDE SERPL-SCNC: 105 MMOL/L (ref 96–112)
CHOLEST SERPL-MCNC: 156 MG/DL (ref 100–199)
CO2 SERPL-SCNC: 25 MMOL/L (ref 20–33)
CREAT SERPL-MCNC: 0.49 MG/DL (ref 0.5–1.4)
EOSINOPHIL # BLD AUTO: 0.04 K/UL (ref 0–0.51)
EOSINOPHIL NFR BLD: 0.8 % (ref 0–6.9)
ERYTHROCYTE [DISTWIDTH] IN BLOOD BY AUTOMATED COUNT: 48.3 FL (ref 35.9–50)
EST. AVERAGE GLUCOSE BLD GHB EST-MCNC: 134 MG/DL
FASTING STATUS PATIENT QL REPORTED: NORMAL
GFR SERPLBLD CREATININE-BSD FMLA CKD-EPI: 112 ML/MIN/1.73 M 2
GLOBULIN SER CALC-MCNC: 2.7 G/DL (ref 1.9–3.5)
GLUCOSE SERPL-MCNC: 109 MG/DL (ref 65–99)
HBA1C MFR BLD: 6.3 % (ref 4–5.6)
HCT VFR BLD AUTO: 40.6 % (ref 37–47)
HCV AB SER QL: NORMAL
HDLC SERPL-MCNC: 80 MG/DL
HGB BLD-MCNC: 13.1 G/DL (ref 12–16)
IMM GRANULOCYTES # BLD AUTO: 0.01 K/UL (ref 0–0.11)
IMM GRANULOCYTES NFR BLD AUTO: 0.2 % (ref 0–0.9)
LDLC SERPL CALC-MCNC: 65 MG/DL
LYMPHOCYTES # BLD AUTO: 2.2 K/UL (ref 1–4.8)
LYMPHOCYTES NFR BLD: 46.6 % (ref 22–41)
MCH RBC QN AUTO: 29.1 PG (ref 27–33)
MCHC RBC AUTO-ENTMCNC: 32.3 G/DL (ref 32.2–35.5)
MCV RBC AUTO: 90.2 FL (ref 81.4–97.8)
MONOCYTES # BLD AUTO: 0.28 K/UL (ref 0–0.85)
MONOCYTES NFR BLD AUTO: 5.9 % (ref 0–13.4)
NEUTROPHILS # BLD AUTO: 2.17 K/UL (ref 1.82–7.42)
NEUTROPHILS NFR BLD: 46.1 % (ref 44–72)
NRBC # BLD AUTO: 0 K/UL
NRBC BLD-RTO: 0 /100 WBC (ref 0–0.2)
PLATELET # BLD AUTO: 245 K/UL (ref 164–446)
PMV BLD AUTO: 9.7 FL (ref 9–12.9)
POTASSIUM SERPL-SCNC: 3.8 MMOL/L (ref 3.6–5.5)
PROT SERPL-MCNC: 7.2 G/DL (ref 6–8.2)
RBC # BLD AUTO: 4.5 M/UL (ref 4.2–5.4)
SODIUM SERPL-SCNC: 142 MMOL/L (ref 135–145)
TRIGL SERPL-MCNC: 57 MG/DL (ref 0–149)
WBC # BLD AUTO: 4.7 K/UL (ref 4.8–10.8)

## 2023-08-09 PROCEDURE — 80061 LIPID PANEL: CPT

## 2023-08-09 PROCEDURE — 85025 COMPLETE CBC W/AUTO DIFF WBC: CPT

## 2023-08-09 PROCEDURE — 86803 HEPATITIS C AB TEST: CPT

## 2023-08-09 PROCEDURE — 80053 COMPREHEN METABOLIC PANEL: CPT

## 2023-08-09 PROCEDURE — 83036 HEMOGLOBIN GLYCOSYLATED A1C: CPT

## 2023-08-09 PROCEDURE — 82306 VITAMIN D 25 HYDROXY: CPT

## 2023-08-09 PROCEDURE — 36415 COLL VENOUS BLD VENIPUNCTURE: CPT

## 2023-08-15 ENCOUNTER — OFFICE VISIT (OUTPATIENT)
Dept: MEDICAL GROUP | Facility: MEDICAL CENTER | Age: 53
End: 2023-08-15
Payer: COMMERCIAL

## 2023-08-15 VITALS
DIASTOLIC BLOOD PRESSURE: 70 MMHG | OXYGEN SATURATION: 100 % | HEIGHT: 63 IN | BODY MASS INDEX: 22.93 KG/M2 | TEMPERATURE: 98.4 F | HEART RATE: 63 BPM | SYSTOLIC BLOOD PRESSURE: 110 MMHG | WEIGHT: 129.41 LBS

## 2023-08-15 DIAGNOSIS — E78.00 PURE HYPERCHOLESTEROLEMIA: ICD-10-CM

## 2023-08-15 DIAGNOSIS — M25.511 CHRONIC RIGHT SHOULDER PAIN: ICD-10-CM

## 2023-08-15 DIAGNOSIS — R73.03 PREDIABETES: ICD-10-CM

## 2023-08-15 DIAGNOSIS — G89.29 CHRONIC RIGHT SHOULDER PAIN: ICD-10-CM

## 2023-08-15 DIAGNOSIS — R87.810 ASCUS WITH POSITIVE HIGH RISK HPV CERVICAL: ICD-10-CM

## 2023-08-15 DIAGNOSIS — S46.811D STRAIN OF RIGHT TRAPEZIUS MUSCLE, SUBSEQUENT ENCOUNTER: ICD-10-CM

## 2023-08-15 DIAGNOSIS — G47.62 NOCTURNAL LEG CRAMPS: ICD-10-CM

## 2023-08-15 DIAGNOSIS — S46.811A STRAIN OF RIGHT TRAPEZIUS MUSCLE, INITIAL ENCOUNTER: ICD-10-CM

## 2023-08-15 DIAGNOSIS — R87.610 ASCUS WITH POSITIVE HIGH RISK HPV CERVICAL: ICD-10-CM

## 2023-08-15 PROCEDURE — 20552 NJX 1/MLT TRIGGER POINT 1/2: CPT | Performed by: FAMILY MEDICINE

## 2023-08-15 PROCEDURE — 3078F DIAST BP <80 MM HG: CPT | Performed by: FAMILY MEDICINE

## 2023-08-15 PROCEDURE — 3074F SYST BP LT 130 MM HG: CPT | Performed by: FAMILY MEDICINE

## 2023-08-15 PROCEDURE — 99214 OFFICE O/P EST MOD 30 MIN: CPT | Mod: 25 | Performed by: FAMILY MEDICINE

## 2023-08-15 RX ORDER — TRIAMCINOLONE ACETONIDE 40 MG/ML
40 INJECTION, SUSPENSION INTRA-ARTICULAR; INTRAMUSCULAR ONCE
Status: COMPLETED | OUTPATIENT
Start: 2023-08-15 | End: 2023-08-15

## 2023-08-15 RX ORDER — TIZANIDINE 4 MG/1
4 TABLET ORAL
Qty: 30 TABLET | Refills: 3 | Status: SHIPPED | OUTPATIENT
Start: 2023-08-15 | End: 2023-10-17 | Stop reason: SDUPTHER

## 2023-08-15 RX ORDER — GABAPENTIN 100 MG/1
100 CAPSULE ORAL
Qty: 90 CAPSULE | Refills: 0 | Status: SHIPPED | OUTPATIENT
Start: 2023-08-15 | End: 2023-10-17 | Stop reason: SDUPTHER

## 2023-08-15 RX ADMIN — TRIAMCINOLONE ACETONIDE 40 MG: 40 INJECTION, SUSPENSION INTRA-ARTICULAR; INTRAMUSCULAR at 14:02

## 2023-08-15 ASSESSMENT — FIBROSIS 4 INDEX: FIB4 SCORE: 1.45

## 2023-08-15 NOTE — PATIENT INSTRUCTIONS
[facility]AP[facility]  [address] [address]  Phone: [phone]

## 2023-08-15 NOTE — PROGRESS NOTES
Subjective:   CC: Chronic right posterior shoulder pain    HPI:     Rory Barajas is a 53 y.o. female, established patient of the clinic.     Patient suffers chronic right posterior pain after ground-level fall approximately 1 years ago.  She tried massage, cupping, chiropractic adjustment without significant relief.  At previous office visit, she was referred to physical therapy.  Unfortunately she reports that she has not been contacted by referral department.  She was not able to log into [facility] due to password issue.  She complains of persistent pain that is worse at night leading to poor sleep.  Symptoms are also worse with certain activities.  It is difficult for patient to identify these activities.  Patient is right-handed.  She works at a .  Her employment requires repetitive usage of her upper extremities daily.    It is difficult for patient to scale back on work activity as she is extremely busy in the summer.  Patient has chronic fibromyalgia.  She used to take Cymbalta, Lyrica but discontinued due to side effects.  She now takes gummy CBD as needed for symptoms.  Denies fever, chills, neck pain, rash, radicular symptoms.  Right shoulder x-ray done in April 2023 was normal.  Patient also complains of frequent nocturnal leg cramps.  She drinks more than 60 ounces of fluid.  She is taking multivitamin daily.  Is no recent changes in physical activity or medication.  Patient had chronic prediabetes, hyperlipidemia.  She is taking Crestor 10 mg daily.  She is working on dietary and lifestyle modification to improve these conditions.  Patient was previously found to have ASCUS with positive high-risk HPV.  She was referred to OB/GYN.  Patient reported that she had colposcopy done by Dr. Frida Hill.  Records are not available for review.  However, patient reports that colposcopy was negative.  Records was requested in the past, but did not receive.    Current medicines (including changes  "today)  Current Outpatient Medications   Medication Sig Dispense Refill    gabapentin (NEURONTIN) 100 MG Cap Take 1 Capsule by mouth at bedtime. 90 Capsule 0    tizanidine (ZANAFLEX) 4 MG Tab Take 1 Tablet by mouth at bedtime. 30 Tablet 3    rosuvastatin (CRESTOR) 10 MG Tab TAKE ONE TABLET BY MOUTH IN THE EVENING 90 Tablet 1     No current facility-administered medications for this visit.     She  has no past medical history on file.    I reviewed patient's problem list, allergies, medications, family hx, social hx with patient and update EPIC.        Objective:     /70 (BP Location: Right arm, Patient Position: Sitting, BP Cuff Size: Adult)   Pulse 63   Temp 36.9 °C (98.4 °F) (Temporal)   Ht 1.6 m (5' 3\")   Wt 58.7 kg (129 lb 6.6 oz)   SpO2 100%  Body mass index is 22.92 kg/m².    Physical Exam:  Constitutional: awake, alert, in no distress.  Skin: Warm, dry, good turgor, no rashes, bruises, ulcers in visible areas.  Eye: conjunctiva clear, lids neg for edema or lesions.  Neck: Trachea midline, no masses, no thyromegaly. No cervical or supraclavicular lymphadenopathy  Respiratory: Unlabored respiratory effort, lungs clear to auscultation, no wheezes, no rales.  Cardiovascular: Normal S1, S2, no murmur, no pedal edema.  Psych: Oriented x3, affect and mood wnl, intact judgement and insight.   Physical Exam  Musculoskeletal:      Right shoulder: No swelling, deformity, effusion, laceration, tenderness, bony tenderness or crepitus. Normal range of motion. Normal strength. Normal pulse.      Thoracic back: Spasms and tenderness present. No swelling, edema, deformity, signs of trauma, lacerations or bony tenderness. Decreased range of motion. No scoliosis.        Back:            Assessment and Plan:   The following treatment plan was discussed    1. Chronic right shoulder pain  2. Strain of right trapezius muscle, subsequent encounter  Patient suffers chronic right posterior shoulder pain for over a year " after ground-level fall.  She tried anti-inflammatory medications, massage, cupping, chiropractic adjustment without relief.  History and exams are concerning for right trapezius strain.  I discussed treatment options with patient.  She is interested in trigger point injection and acupuncture.  - Referral for Acupuncture  - triamcinolone acetonide (Kenalog-40) injection 40 mg  -Continue tizanidine nightly   -Continue NSAIDs as needed  -Heat as tolerated  -Activity modification  -Schedule appointment with physical therapy  -Follow-up in 3 months as needed    3. Nocturnal leg cramps  Acute, intermittent nocturnal leg cramps.  Symptoms are not improving with hydration.  -Magnesium supplement  -Hydration discussed  -Stretching exercises provided  -Trial of gabapentin if above measures fail to improve symptoms: Gabapentin (NEURONTIN) 100 MG Cap; Take 1 Capsule by mouth at bedtime.  Dispense: 90 Capsule; Refill: 0    4. Prediabetes  Recent A1c was 6.3.  Negative familial history of type 2 diabetes.  Patient is asymptomatic.  Recommended metformin, but patient declined.  She is interested in dietary and lifestyle modification to improve this condition.  Appropriate counseling provided.  Brief dietary counseling provided  - HEMOGLOBIN A1C; Future    6. Pure hypercholesterolemia  Chronic, controlled with Lipitor 10 mg daily, no s/e reported, will continue.      7. ASCUS with positive high risk HPV cervical  Patient was previously found to have ASCUS with positive high-risk HPV.  She was referred to OB/GYN.  Patient reported that she had colposcopy done by Dr. Frida Hill.  Records are not available for review.  However, patient reports that colposcopy was negative.  Records was requested in the past, but did not receive.  -Repeat Pap smear with cotesting in 1 year  -We will request records from Dr. Frida Hill.    Procedure note: Trigger point injection.   Thorough PARQ held, including but not limited to the risk of  steroid flare, hypopigmentation, infection, bleeding, and tendon damage. Patient expressed understanding and wished to proceed. Verbal consent obtained.    Location: Right lower trapezius  Solution: 8 mL of 1% lidocaine without epinephrine mixed with 40 mg of Kenalog solution 40 mg/ml    Insertion sites were identified, marked, and prepped with with alcohol pads.  Number of injection sites: 10  The syringe/needle was positioned at a 90-degree angle to the skin. Using the no-touch technique, the needle was introduced at each insertion site and advanced until the needle tip reached the depth of 1-1.5. 1ml of the above solution was injected into the soft issue. Following injections of the corticosteroid solution, the needle was withdrawn and sterile adhesive bandage was applied. Injection site was gently massaged. Pt was instructed to move her right shoulder/neck through its full range of motion. Pt endorses pain relief 5 minutes after injection.Home care instruction provided.       Chey Foster M.D.      Followup: Return in about 6 months (around 2/15/2024) for Multiple issues.    Please note that this dictation was created using voice recognition software. I have made every reasonable attempt to correct obvious errors, but I expect that there are errors of grammar and possibly content that I did not discover before finalizing the note.

## 2023-10-17 ENCOUNTER — OFFICE VISIT (OUTPATIENT)
Dept: MEDICAL GROUP | Facility: MEDICAL CENTER | Age: 53
End: 2023-10-17
Payer: COMMERCIAL

## 2023-10-17 VITALS
RESPIRATION RATE: 18 BRPM | OXYGEN SATURATION: 96 % | TEMPERATURE: 98.6 F | DIASTOLIC BLOOD PRESSURE: 70 MMHG | HEART RATE: 62 BPM | BODY MASS INDEX: 23.57 KG/M2 | HEIGHT: 63 IN | WEIGHT: 133 LBS | SYSTOLIC BLOOD PRESSURE: 102 MMHG

## 2023-10-17 DIAGNOSIS — S46.811D STRAIN OF RIGHT TRAPEZIUS MUSCLE, SUBSEQUENT ENCOUNTER: ICD-10-CM

## 2023-10-17 DIAGNOSIS — Z98.82 BREAST IMPLANT IN SITU: ICD-10-CM

## 2023-10-17 DIAGNOSIS — E78.00 PURE HYPERCHOLESTEROLEMIA: ICD-10-CM

## 2023-10-17 DIAGNOSIS — G47.62 NOCTURNAL LEG CRAMPS: ICD-10-CM

## 2023-10-17 DIAGNOSIS — N64.4 BREAST PAIN IN FEMALE: ICD-10-CM

## 2023-10-17 DIAGNOSIS — M25.511 CHRONIC RIGHT SHOULDER PAIN: ICD-10-CM

## 2023-10-17 DIAGNOSIS — G89.29 CHRONIC RIGHT SHOULDER PAIN: ICD-10-CM

## 2023-10-17 PROCEDURE — 3074F SYST BP LT 130 MM HG: CPT | Performed by: FAMILY MEDICINE

## 2023-10-17 PROCEDURE — 20610 DRAIN/INJ JOINT/BURSA W/O US: CPT | Performed by: FAMILY MEDICINE

## 2023-10-17 PROCEDURE — 90686 IIV4 VACC NO PRSV 0.5 ML IM: CPT | Performed by: FAMILY MEDICINE

## 2023-10-17 PROCEDURE — 3078F DIAST BP <80 MM HG: CPT | Performed by: FAMILY MEDICINE

## 2023-10-17 PROCEDURE — 99215 OFFICE O/P EST HI 40 MIN: CPT | Mod: 25 | Performed by: FAMILY MEDICINE

## 2023-10-17 PROCEDURE — 90471 IMMUNIZATION ADMIN: CPT | Performed by: FAMILY MEDICINE

## 2023-10-17 RX ORDER — TIZANIDINE 4 MG/1
4 TABLET ORAL
Qty: 90 TABLET | Refills: 0 | Status: SHIPPED | OUTPATIENT
Start: 2023-10-17 | End: 2023-11-17 | Stop reason: SDUPTHER

## 2023-10-17 RX ORDER — GABAPENTIN 100 MG/1
100 CAPSULE ORAL
Qty: 90 CAPSULE | Refills: 0 | Status: SHIPPED | OUTPATIENT
Start: 2023-10-17 | End: 2023-11-17 | Stop reason: SDUPTHER

## 2023-10-17 RX ORDER — ROSUVASTATIN CALCIUM 10 MG/1
10 TABLET, COATED ORAL EVERY EVENING
Qty: 90 TABLET | Refills: 3 | Status: SHIPPED | OUTPATIENT
Start: 2023-10-17

## 2023-10-17 RX ORDER — TRIAMCINOLONE ACETONIDE 40 MG/ML
40 INJECTION, SUSPENSION INTRA-ARTICULAR; INTRAMUSCULAR ONCE
Status: COMPLETED | OUTPATIENT
Start: 2023-10-17 | End: 2023-10-17

## 2023-10-17 RX ADMIN — TRIAMCINOLONE ACETONIDE 40 MG: 40 INJECTION, SUSPENSION INTRA-ARTICULAR; INTRAMUSCULAR at 16:33

## 2023-10-17 ASSESSMENT — FIBROSIS 4 INDEX: FIB4 SCORE: 1.45

## 2023-10-17 NOTE — PROGRESS NOTES
Subjective:   CC: chronic right shoulder pain follow up     HPI:     Rory Barajas is a 53 y.o. female, established patient of the clinic.  Patient has been suffering chronic right posterior shoulder pain, chronic right-sided thoracic back pain.  Patient will be seen by myself on 8/15/2023.  Below is the previous HPI:     Patient suffers chronic right posterior pain after ground-level fall approximately 1 years ago.  She tried massage, cupping, chiropractic adjustment without significant relief.  At previous office visit, she was referred to physical therapy.  Unfortunately she reports that she has not been contacted by referral department.  She was not able to log into [facility] due to password issue.  She complains of persistent pain that is worse at night leading to poor sleep.  Symptoms are also worse with certain activities.  It is difficult for patient to identify these activities.  Patient is right-handed.  She works at a .  Her employment requires repetitive usage of her upper extremities daily.    It is difficult for patient to scale back on work activity as she is extremely busy in the summer.  Patient has chronic fibromyalgia.  She used to take Cymbalta, Lyrica but discontinued due to side effects.  She now takes gummy CBD as needed for symptoms.  Denies fever, chills, neck pain, rash, radicular symptoms.  Right shoulder x-ray done in April 2023 was normal.  Patient also complains of frequent nocturnal leg cramps.  She drinks more than 60 ounces of fluid.  She is taking multivitamin daily.  Is no recent changes in physical activity or medication.    Interval history:  Patient received trigger point injections at previous office visit.  Her symptoms are improving approximately 60%.  Patient also is working with acupuncturist and physical therapist.  She is not sure if acupuncture and physical therapy are helping with her symptoms.  She continues to have right shoulder pain with movement especially  "with activity above the shoulder or external/internal rotation.  She takes tizanidine 4 mg nightly as needed.  No side effect reported.  She is also taking over-the-counter medication as needed for acute worsening pain.  Patient complained of nocturnal leg cramps at previous office visit.  She was prescribed gabapentin 100 mg at night.  She states that her symptoms are improving.  She continues to keep herself hydrated.  She continues to take Crestor 10 mg daily for hyperlipidemia.  Patient had breast implant done in [place] in 2022.  A few months ago, she developed intermittent squeezing, tightness, spasm sensation across her breast/implants.  Over the past few months, these symptoms appear to be worse.  Negative abnormal nipple bleeding or discharge.  Negative breast/axillary/supraclavicular masses.  Her last mammogram was normal in November 2021 (prior to breast implantation). Patient is concerns of implant leaks.     Current medicines (including changes today)  Current Outpatient Medications   Medication Sig Dispense Refill    rosuvastatin (CRESTOR) 10 MG Tab Take 1 Tablet by mouth every evening. 90 Tablet 3    gabapentin (NEURONTIN) 100 MG Cap Take 1 Capsule by mouth at bedtime. 90 Capsule 0    tizanidine (ZANAFLEX) 4 MG Tab Take 1 Tablet by mouth at bedtime. 90 Tablet 0     No current facility-administered medications for this visit.     She  has no past medical history on file.    I reviewed patient's problem list, allergies, medications, family hx, social hx with patient and update EPIC.        Objective:     /70 (BP Location: Left arm, Patient Position: Sitting, BP Cuff Size: Adult)   Pulse 62   Temp 37 °C (98.6 °F) (Temporal)   Resp 18   Ht 1.6 m (5' 3\")   Wt 60.3 kg (133 lb)   SpO2 96%  Body mass index is 23.56 kg/m².    Physical Exam:  Constitutional: awake, alert, in no distress. Multiple cupping marks on back and right shoulders.   Skin: Warm, dry, good turgor, no rashes, bruises, ulcers in " visible areas.  Eye: conjunctiva clear, lids neg for edema or lesions.  Respiratory: Unlabored respiratory effort, lungs clear to auscultation, no wheezes, no rales.  Cardiovascular: Normal S1, S2, no murmur, no pedal edema.  Psych: Oriented x3, affect and mood wnl, intact judgement and insight.   Breast exam: negative nipple bleeding, discharge, or breast mass. Negative supraclavicular/axillary lymphadenopathy. Negative rash, breast skin changes, nipple inversion. Implants palpable bilaterally. Breasts appears symmetrical.     Right shoulder exam:  No visible deformity or asymmetry. No joint effusion, bruises, hematoma. No crepitus.   PROM: Mild pain with movement especially internal/external rotation and abduction  AROM: Moderate pain with internal/external rotation and abduction  Apley’s scratch test: Asymmetrical  Cross-body test: Negative   Drop arm test: Negative   Empty can test: Positive  Infraspinatus/teres minor exam: Positive   Lift-off test: Positive  Neer’s impingement test: Positive .   Yergason’s test: Negative   Sulcus sign: Negative   Relocation: Positive     Assessment and Plan:   The following treatment plan was discussed    1. Strain of right trapezius muscle, subsequent encounter  Improving 60% following recent trigger point injections.   - continue acupuncture and physical therapy   - over-the-counter analgesics PRN for pain  - activity modification.   - tizanidine (ZANAFLEX) 4 MG Tab; Take 1 Tablet by mouth at bedtime.  Dispense: 90 Tablet; Refill: 0    2. Chronic right shoulder pain  Chronic, persistent right shoulder pain for more than 12 months.   Conservative management fails to improve symptoms.   - MR-SHOULDER-W/O RIGHT; Future  - steroid injection: triamcinolone acetonide (Kenalog-40) injection 40 mg  - activity modification   - over-the-counter analgesics PRN for pain  - continue physical therapy and acupuncture     3. Breast implant in situ  4. Breast pain in female  -  MR-BREAST-BILATERAL-WITH & W/O; Future    5. Nocturnal leg cramps  Improving with hydration and Gabapentin, will continue.   - gabapentin (NEURONTIN) 100 MG Cap; Take 1 Capsule by mouth at bedtime.  Dispense: 90 Capsule; Refill: 0    6. Pure hypercholesterolemia  Chronic, controlled with Crestor 10 mg qd, no s/e reported, will continue.   - rosuvastatin (CRESTOR) 10 MG Tab; Take 1 Tablet by mouth every evening.  Dispense: 90 Tablet; Refill: 3     Shoulder Injection Procedure Note  Indication: chronic right shoulder pain   Location: right     PARQ: PARQ conference held including but not limited to the risk of steroid flare, hypopigmentation, infection, bleeding, and tendon/cartilage damage. Patient expressed understanding and wished to proceed. Verbal consent obtained.    Procedure:   Pt seated upright, arms hanging to side, small amount of traction to flexed elbow.  Injection site was identified and cleansed thoroughly with alcohol. Using lateral approach, a mixture of 1ml of 40 mg/ml Kenalog + 4 ml of 1% Lidocaine solution was injected into pt's right shoulder w/o resistance. The injected site was dressed with sterile bandage. Passive gentle ROM was done to distribute the medicine. Pt endorses relief of symptoms.   Pt tolerates the procedure well. No immediate complications noted. Aftercare instructions provided.     Total time spent reviewing pt's chart, labs, notes, imaging, and counseling/prescribing medications to patient before, during, and after the visit: 40 minutes.         Chey Foster M.D.      Followup: Return in about 4 weeks (around 11/14/2023) for Multiple issues.    Please note that this dictation was created using voice recognition software. I have made every reasonable attempt to correct obvious errors, but I expect that there are errors of grammar and possibly content that I did not discover before finalizing the note.

## 2023-10-30 ENCOUNTER — HOSPITAL ENCOUNTER (OUTPATIENT)
Dept: RADIOLOGY | Facility: MEDICAL CENTER | Age: 53
End: 2023-10-30
Attending: FAMILY MEDICINE
Payer: COMMERCIAL

## 2023-10-30 DIAGNOSIS — G89.29 CHRONIC RIGHT SHOULDER PAIN: ICD-10-CM

## 2023-10-30 DIAGNOSIS — M25.511 CHRONIC RIGHT SHOULDER PAIN: ICD-10-CM

## 2023-10-30 PROCEDURE — 73221 MRI JOINT UPR EXTREM W/O DYE: CPT | Mod: RT

## 2023-11-06 ENCOUNTER — APPOINTMENT (OUTPATIENT)
Dept: RADIOLOGY | Facility: MEDICAL CENTER | Age: 53
End: 2023-11-06
Attending: FAMILY MEDICINE
Payer: COMMERCIAL

## 2023-11-06 DIAGNOSIS — N64.4 BREAST PAIN IN FEMALE: ICD-10-CM

## 2023-11-06 DIAGNOSIS — Z98.82 BREAST IMPLANT IN SITU: ICD-10-CM

## 2023-11-06 PROCEDURE — A9579 GAD-BASE MR CONTRAST NOS,1ML: HCPCS | Performed by: FAMILY MEDICINE

## 2023-11-06 PROCEDURE — C8908 MRI W/O FOL W/CONT, BREAST,: HCPCS

## 2023-11-06 PROCEDURE — 700117 HCHG RX CONTRAST REV CODE 255: Performed by: FAMILY MEDICINE

## 2023-11-06 RX ADMIN — GADOTERIDOL 15 ML: 279.3 INJECTION, SOLUTION INTRAVENOUS at 11:18

## 2023-11-17 ENCOUNTER — OFFICE VISIT (OUTPATIENT)
Dept: MEDICAL GROUP | Facility: MEDICAL CENTER | Age: 53
End: 2023-11-17
Payer: COMMERCIAL

## 2023-11-17 VITALS
OXYGEN SATURATION: 100 % | BODY MASS INDEX: 23.59 KG/M2 | DIASTOLIC BLOOD PRESSURE: 80 MMHG | SYSTOLIC BLOOD PRESSURE: 120 MMHG | HEIGHT: 63 IN | WEIGHT: 133.16 LBS | TEMPERATURE: 98.1 F | HEART RATE: 60 BPM

## 2023-11-17 DIAGNOSIS — G89.29 CHRONIC RIGHT SHOULDER PAIN: ICD-10-CM

## 2023-11-17 DIAGNOSIS — E78.00 PURE HYPERCHOLESTEROLEMIA: ICD-10-CM

## 2023-11-17 DIAGNOSIS — M25.511 CHRONIC RIGHT SHOULDER PAIN: ICD-10-CM

## 2023-11-17 DIAGNOSIS — R73.03 PREDIABETES: ICD-10-CM

## 2023-11-17 DIAGNOSIS — G47.62 NOCTURNAL LEG CRAMPS: ICD-10-CM

## 2023-11-17 DIAGNOSIS — Z11.4 ENCOUNTER FOR SCREENING FOR HIV: ICD-10-CM

## 2023-11-17 DIAGNOSIS — S46.811D STRAIN OF RIGHT TRAPEZIUS MUSCLE, SUBSEQUENT ENCOUNTER: ICD-10-CM

## 2023-11-17 PROCEDURE — 3079F DIAST BP 80-89 MM HG: CPT | Performed by: FAMILY MEDICINE

## 2023-11-17 PROCEDURE — 3074F SYST BP LT 130 MM HG: CPT | Performed by: FAMILY MEDICINE

## 2023-11-17 PROCEDURE — 99214 OFFICE O/P EST MOD 30 MIN: CPT | Performed by: FAMILY MEDICINE

## 2023-11-17 RX ORDER — TIZANIDINE 4 MG/1
4 TABLET ORAL
Qty: 90 TABLET | Refills: 1 | Status: SHIPPED | OUTPATIENT
Start: 2023-11-17

## 2023-11-17 RX ORDER — GABAPENTIN 100 MG/1
100 CAPSULE ORAL
Qty: 90 CAPSULE | Refills: 1 | Status: SHIPPED | OUTPATIENT
Start: 2023-11-17

## 2023-11-17 ASSESSMENT — FIBROSIS 4 INDEX: FIB4 SCORE: 1.45

## 2023-11-20 NOTE — PROGRESS NOTES
"Subjective:   CC: Right shoulder pain follow-up    HPI:     Rory Barajas is a 53 y.o. female, established patient of the clinic.     Patient has been suffering chronic right shoulder pain and right trapezius muscle strain.  Patient received intra-articular injection and trigger point injection a few months ago.  Patient is also working with acupuncture.  She has not been working with physical therapy due to problems with scheduling appointment.  Today, she states that her symptoms are 70% improved.  Patient is planning to take 2 months vacation to allow her body to recover and to allow time for physical therapy.  She continues to take gabapentin and tizanidine nightly as directed.  No side effect reported.  She has chronic hyperlipidemia and prediabetes.  She is taking Crestor daily as directed.  She is working on dietary and lifestyle modification to improve these conditions.  Nocturnal leg cramps are improving with gabapentin.  Patient is doing well.  She does not have any acute concerns.    Current medicines (including changes today)  Current Outpatient Medications   Medication Sig Dispense Refill    tizanidine (ZANAFLEX) 4 MG Tab Take 1 Tablet by mouth at bedtime. 90 Tablet 1    gabapentin (NEURONTIN) 100 MG Cap Take 1 Capsule by mouth at bedtime. 90 Capsule 1    rosuvastatin (CRESTOR) 10 MG Tab Take 1 Tablet by mouth every evening. 90 Tablet 3     No current facility-administered medications for this visit.     She  has no past medical history on file.    I reviewed patient's problem list, allergies, medications, family hx, social hx with patient and update EPIC.        Objective:     /80 (BP Location: Left arm, Patient Position: Sitting, BP Cuff Size: Adult)   Pulse 60   Temp 36.7 °C (98.1 °F) (Temporal)   Ht 1.6 m (5' 3\")   Wt 60.4 kg (133 lb 2.5 oz)   SpO2 100%  Body mass index is 23.59 kg/m².    Physical Exam:  Constitutional: awake, alert, in no distress.  Skin: Warm, dry, good turgor, no rashes, " bruises, ulcers in visible areas.  Eye: conjunctiva clear, lids neg for edema or lesions.  Respiratory: Unlabored respiratory effort, lungs clear to auscultation, no wheezes, no rales.  Cardiovascular: Normal S1, S2, no murmur, no pedal edema.  Psych: Oriented x3, affect and mood wnl, intact judgement and insight.       Assessment and Plan:   The following treatment plan was discussed    1. Chronic right shoulder pain  2. Strain of right trapezius muscle, subsequent encounter  Chronic ongoing right shoulder pain and right trapezius muscle strain for over a year.  Symptoms are improving approximately 70% with intra-articular injection and trigger point injections in recent past.  Patient is working with acupuncture.  She is planning to take to 8 weeks of vacation to allow time to work with physical therapy.   - tizanidine (ZANAFLEX) 4 MG Tab; Take 1 Tablet by mouth at bedtime.  Dispense: 90 Tablet; Refill: 1  - over-the-counter analgesics as needed for pain  - Activity modification, continue rehab exercises  - Continue to work with physical therapy and acupuncture  - Follow-up in 6 months or sooner as needed    3. Pure hypercholesterolemia  Chronic, controlled with Crestor 10 mg daily, no s/e reported, will continue.    - CBC WITH DIFFERENTIAL; Future  - Comp Metabolic Panel; Future  - Lipid Profile; Future    4. Prediabetes  - Dietary/lifestyle modification and weight loss    - HEMOGLOBIN A1C; Future    5. Nocturnal leg cramps  Chronic, controlled with gabapentin 100 mg daily, no s/e reported, will continue.    - gabapentin (NEURONTIN) 100 MG Cap; Take 1 Capsule by mouth at bedtime.  Dispense: 90 Capsule; Refill: 1    6. Encounter for screening for HIV  - HIV AG/AB COMBO ASSAY SCREENING; Future     Ly YESENIA Foster M.D.      Followup: Return in about 6 months (around 5/17/2024) for Pap.    Please note that this dictation was created using voice recognition software. I have made every reasonable attempt to correct obvious  errors, but I expect that there are errors of grammar and possibly content that I did not discover before finalizing the note.

## 2023-12-07 ENCOUNTER — APPOINTMENT (OUTPATIENT)
Dept: MEDICAL GROUP | Facility: MEDICAL CENTER | Age: 53
End: 2023-12-07
Payer: COMMERCIAL

## 2024-05-14 ENCOUNTER — HOSPITAL ENCOUNTER (OUTPATIENT)
Dept: LAB | Facility: MEDICAL CENTER | Age: 54
End: 2024-05-14
Attending: FAMILY MEDICINE
Payer: COMMERCIAL

## 2024-05-14 DIAGNOSIS — Z11.4 ENCOUNTER FOR SCREENING FOR HIV: ICD-10-CM

## 2024-05-14 DIAGNOSIS — R73.03 PREDIABETES: ICD-10-CM

## 2024-05-14 DIAGNOSIS — E78.00 PURE HYPERCHOLESTEROLEMIA: ICD-10-CM

## 2024-05-14 LAB
ALBUMIN SERPL BCP-MCNC: 4.6 G/DL (ref 3.2–4.9)
ALBUMIN/GLOB SERPL: 1.7 G/DL
ALP SERPL-CCNC: 63 U/L (ref 30–99)
ALT SERPL-CCNC: 36 U/L (ref 2–50)
ANION GAP SERPL CALC-SCNC: 12 MMOL/L (ref 7–16)
AST SERPL-CCNC: 28 U/L (ref 12–45)
BASOPHILS # BLD AUTO: 0.3 % (ref 0–1.8)
BASOPHILS # BLD: 0.02 K/UL (ref 0–0.12)
BILIRUB SERPL-MCNC: 0.7 MG/DL (ref 0.1–1.5)
BUN SERPL-MCNC: 9 MG/DL (ref 8–22)
CALCIUM ALBUM COR SERPL-MCNC: 9.1 MG/DL (ref 8.5–10.5)
CALCIUM SERPL-MCNC: 9.6 MG/DL (ref 8.5–10.5)
CHLORIDE SERPL-SCNC: 107 MMOL/L (ref 96–112)
CHOLEST SERPL-MCNC: 139 MG/DL (ref 100–199)
CO2 SERPL-SCNC: 24 MMOL/L (ref 20–33)
CREAT SERPL-MCNC: 0.55 MG/DL (ref 0.5–1.4)
EOSINOPHIL # BLD AUTO: 0.04 K/UL (ref 0–0.51)
EOSINOPHIL NFR BLD: 0.7 % (ref 0–6.9)
ERYTHROCYTE [DISTWIDTH] IN BLOOD BY AUTOMATED COUNT: 43.1 FL (ref 35.9–50)
EST. AVERAGE GLUCOSE BLD GHB EST-MCNC: 134 MG/DL
FASTING STATUS PATIENT QL REPORTED: NORMAL
GFR SERPLBLD CREATININE-BSD FMLA CKD-EPI: 109 ML/MIN/1.73 M 2
GLOBULIN SER CALC-MCNC: 2.7 G/DL (ref 1.9–3.5)
GLUCOSE SERPL-MCNC: 106 MG/DL (ref 65–99)
HBA1C MFR BLD: 6.3 % (ref 4–5.6)
HCT VFR BLD AUTO: 45.4 % (ref 37–47)
HDLC SERPL-MCNC: 78 MG/DL
HGB BLD-MCNC: 15.5 G/DL (ref 12–16)
HIV 1+2 AB+HIV1 P24 AG SERPL QL IA: NORMAL
IMM GRANULOCYTES # BLD AUTO: 0.02 K/UL (ref 0–0.11)
IMM GRANULOCYTES NFR BLD AUTO: 0.3 % (ref 0–0.9)
LDLC SERPL CALC-MCNC: 49 MG/DL
LYMPHOCYTES # BLD AUTO: 2.46 K/UL (ref 1–4.8)
LYMPHOCYTES NFR BLD: 42.2 % (ref 22–41)
MCH RBC QN AUTO: 30.9 PG (ref 27–33)
MCHC RBC AUTO-ENTMCNC: 34.1 G/DL (ref 32.2–35.5)
MCV RBC AUTO: 90.6 FL (ref 81.4–97.8)
MONOCYTES # BLD AUTO: 0.29 K/UL (ref 0–0.85)
MONOCYTES NFR BLD AUTO: 5 % (ref 0–13.4)
NEUTROPHILS # BLD AUTO: 3 K/UL (ref 1.82–7.42)
NEUTROPHILS NFR BLD: 51.5 % (ref 44–72)
NRBC # BLD AUTO: 0 K/UL
NRBC BLD-RTO: 0 /100 WBC (ref 0–0.2)
PLATELET # BLD AUTO: 241 K/UL (ref 164–446)
PMV BLD AUTO: 9.6 FL (ref 9–12.9)
POTASSIUM SERPL-SCNC: 4.3 MMOL/L (ref 3.6–5.5)
PROT SERPL-MCNC: 7.3 G/DL (ref 6–8.2)
RBC # BLD AUTO: 5.01 M/UL (ref 4.2–5.4)
SODIUM SERPL-SCNC: 143 MMOL/L (ref 135–145)
TRIGL SERPL-MCNC: 62 MG/DL (ref 0–149)
WBC # BLD AUTO: 5.8 K/UL (ref 4.8–10.8)

## 2024-05-15 ENCOUNTER — TELEPHONE (OUTPATIENT)
Dept: MEDICAL GROUP | Facility: MEDICAL CENTER | Age: 54
End: 2024-05-15
Payer: COMMERCIAL

## 2024-05-15 DIAGNOSIS — Z12.31 ENCOUNTER FOR SCREENING MAMMOGRAM FOR BREAST CANCER: ICD-10-CM

## 2024-05-15 DIAGNOSIS — Z98.82 BREAST IMPLANT IN SITU: ICD-10-CM

## 2024-05-20 ENCOUNTER — OFFICE VISIT (OUTPATIENT)
Dept: MEDICAL GROUP | Facility: MEDICAL CENTER | Age: 54
End: 2024-05-20
Payer: COMMERCIAL

## 2024-05-20 ENCOUNTER — HOSPITAL ENCOUNTER (OUTPATIENT)
Facility: MEDICAL CENTER | Age: 54
End: 2024-05-20
Attending: FAMILY MEDICINE
Payer: COMMERCIAL

## 2024-05-20 VITALS
TEMPERATURE: 97.9 F | SYSTOLIC BLOOD PRESSURE: 122 MMHG | OXYGEN SATURATION: 98 % | HEART RATE: 62 BPM | DIASTOLIC BLOOD PRESSURE: 74 MMHG | BODY MASS INDEX: 24.61 KG/M2 | HEIGHT: 63 IN | WEIGHT: 138.89 LBS

## 2024-05-20 DIAGNOSIS — Z11.51 SPECIAL SCREENING EXAMINATION FOR HUMAN PAPILLOMAVIRUS (HPV): ICD-10-CM

## 2024-05-20 DIAGNOSIS — G47.62 NOCTURNAL LEG CRAMPS: ICD-10-CM

## 2024-05-20 DIAGNOSIS — R87.610 ASCUS WITH POSITIVE HIGH RISK HPV CERVICAL: ICD-10-CM

## 2024-05-20 DIAGNOSIS — S16.1XXA STRAIN OF NECK MUSCLE, INITIAL ENCOUNTER: ICD-10-CM
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
## 2024-05-20 DIAGNOSIS — E78.00 PURE HYPERCHOLESTEROLEMIA: ICD-10-CM

## 2024-05-20 DIAGNOSIS — R73.03 PREDIABETES: ICD-10-CM

## 2024-05-20 DIAGNOSIS — R87.810 ASCUS WITH POSITIVE HIGH RISK HPV CERVICAL: ICD-10-CM

## 2024-05-20 DIAGNOSIS — N95.2 ATROPHIC VAGINITIS: ICD-10-CM

## 2024-05-20 DIAGNOSIS — Z12.31 ENCOUNTER FOR SCREENING MAMMOGRAM FOR BREAST CANCER: ICD-10-CM

## 2024-05-20 DIAGNOSIS — Z01.419 WELL WOMAN EXAM: Primary | ICD-10-CM

## 2024-05-20 DIAGNOSIS — L03.211 FACIAL CELLULITIS: ICD-10-CM

## 2024-05-20 DIAGNOSIS — Z12.4 ROUTINE CERVICAL SMEAR: ICD-10-CM

## 2024-05-20 DIAGNOSIS — Z01.419 ENCOUNTER FOR WELL WOMAN EXAM WITH ROUTINE GYNECOLOGICAL EXAM: ICD-10-CM

## 2024-05-20 PROCEDURE — 3078F DIAST BP <80 MM HG: CPT | Performed by: FAMILY MEDICINE

## 2024-05-20 PROCEDURE — 99214 OFFICE O/P EST MOD 30 MIN: CPT | Mod: 25 | Performed by: FAMILY MEDICINE

## 2024-05-20 PROCEDURE — 3074F SYST BP LT 130 MM HG: CPT | Performed by: FAMILY MEDICINE

## 2024-05-20 PROCEDURE — 99396 PREV VISIT EST AGE 40-64: CPT | Performed by: FAMILY MEDICINE

## 2024-05-20 RX ORDER — TIZANIDINE 4 MG/1
4 TABLET ORAL
Qty: 90 TABLET | Refills: 1 | Status: SHIPPED | OUTPATIENT
Start: 2024-05-20

## 2024-05-20 RX ORDER — ESTRADIOL 10 UG/1
10 INSERT VAGINAL
Qty: 24 TABLET | Refills: 3 | Status: SHIPPED | OUTPATIENT
Start: 2024-05-20

## 2024-05-20 RX ORDER — GABAPENTIN 100 MG/1
100 CAPSULE ORAL
Qty: 90 CAPSULE | Refills: 1 | Status: SHIPPED | OUTPATIENT
Start: 2024-05-20

## 2024-05-20 RX ORDER — CLINDAMYCIN HYDROCHLORIDE 300 MG/1
300 CAPSULE ORAL 3 TIMES DAILY
Qty: 30 CAPSULE | Refills: 0 | Status: SHIPPED | OUTPATIENT
Start: 2024-05-20 | End: 2024-05-20 | Stop reason: SDUPTHER

## 2024-05-20 RX ORDER — CLINDAMYCIN HYDROCHLORIDE 300 MG/1
300 CAPSULE ORAL 3 TIMES DAILY
Qty: 30 CAPSULE | Refills: 0 | Status: SHIPPED | OUTPATIENT
Start: 2024-05-20 | End: 2024-05-30

## 2024-05-20 RX ORDER — METFORMIN HYDROCHLORIDE 500 MG/1
500 TABLET, EXTENDED RELEASE ORAL DAILY
Qty: 90 TABLET | Refills: 3 | Status: SHIPPED | OUTPATIENT
Start: 2024-05-20

## 2024-05-20 ASSESSMENT — FIBROSIS 4 INDEX: FIB4 SCORE: 1.05

## 2024-05-20 ASSESSMENT — PATIENT HEALTH QUESTIONNAIRE - PHQ9: CLINICAL INTERPRETATION OF PHQ2 SCORE: 0
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
## 2024-05-26 LAB
CYTOLOGIST CVX/VAG CYTO: ABNORMAL
CYTOLOGY CVX/VAG DOC CYTO: ABNORMAL
CYTOLOGY CVX/VAG DOC THIN PREP: ABNORMAL
HPV I/H RISK 4 DNA CVX QL PROBE+SIG AMP: POSITIVE
HPV16 DNA CVX QL PROBE+SIG AMP: NEGATIVE
HPV18+45 E6+E7 MRNA CVX QL NAA+PROBE: NEGATIVE
NOTE NL11727A: ABNORMAL
OTHER STN SPEC: ABNORMAL
STAT OF ADQ CVX/VAG CYTO-IMP: ABNORMAL

## 2024-05-28 PROBLEM — B97.7 HPV IN FEMALE: Status: ACTIVE | Noted: 2022-12-09

## 2024-08-19 ENCOUNTER — APPOINTMENT (OUTPATIENT)
Dept: RADIOLOGY | Facility: MEDICAL CENTER | Age: 54
End: 2024-08-19
Attending: FAMILY MEDICINE
Payer: COMMERCIAL

## 2024-08-19 DIAGNOSIS — Z12.31 ENCOUNTER FOR SCREENING MAMMOGRAM FOR BREAST CANCER: ICD-10-CM

## 2024-08-19 PROCEDURE — 77067 SCR MAMMO BI INCL CAD: CPT

## 2024-11-06 ENCOUNTER — HOSPITAL ENCOUNTER (OUTPATIENT)
Dept: LAB | Facility: MEDICAL CENTER | Age: 54
End: 2024-11-06
Attending: FAMILY MEDICINE
Payer: COMMERCIAL

## 2024-11-06 DIAGNOSIS — R73.03 PREDIABETES: ICD-10-CM

## 2024-11-06 LAB
ALBUMIN SERPL BCP-MCNC: 4.4 G/DL (ref 3.2–4.9)
ALBUMIN/GLOB SERPL: 1.4 G/DL
ALP SERPL-CCNC: 76 U/L (ref 30–99)
ALT SERPL-CCNC: 36 U/L (ref 2–50)
ANION GAP SERPL CALC-SCNC: 8 MMOL/L (ref 7–16)
AST SERPL-CCNC: 31 U/L (ref 12–45)
BILIRUB SERPL-MCNC: 0.8 MG/DL (ref 0.1–1.5)
BUN SERPL-MCNC: 9 MG/DL (ref 8–22)
CALCIUM ALBUM COR SERPL-MCNC: 9.4 MG/DL (ref 8.5–10.5)
CALCIUM SERPL-MCNC: 9.7 MG/DL (ref 8.5–10.5)
CHLORIDE SERPL-SCNC: 106 MMOL/L (ref 96–112)
CO2 SERPL-SCNC: 26 MMOL/L (ref 20–33)
CREAT SERPL-MCNC: 0.86 MG/DL (ref 0.5–1.4)
EST. AVERAGE GLUCOSE BLD GHB EST-MCNC: 137 MG/DL
GFR SERPLBLD CREATININE-BSD FMLA CKD-EPI: 80 ML/MIN/1.73 M 2
GLOBULIN SER CALC-MCNC: 3.2 G/DL (ref 1.9–3.5)
GLUCOSE SERPL-MCNC: 120 MG/DL (ref 65–99)
HBA1C MFR BLD: 6.4 % (ref 4–5.6)
POTASSIUM SERPL-SCNC: 4.6 MMOL/L (ref 3.6–5.5)
PROT SERPL-MCNC: 7.6 G/DL (ref 6–8.2)
SODIUM SERPL-SCNC: 140 MMOL/L (ref 135–145)

## 2024-11-06 PROCEDURE — 36415 COLL VENOUS BLD VENIPUNCTURE: CPT

## 2024-11-06 PROCEDURE — 83036 HEMOGLOBIN GLYCOSYLATED A1C: CPT

## 2024-11-06 PROCEDURE — 80053 COMPREHEN METABOLIC PANEL: CPT

## 2024-11-19 ENCOUNTER — OFFICE VISIT (OUTPATIENT)
Dept: MEDICAL GROUP | Facility: MEDICAL CENTER | Age: 54
End: 2024-11-19
Payer: COMMERCIAL

## 2024-11-19 VITALS
DIASTOLIC BLOOD PRESSURE: 72 MMHG | HEIGHT: 63 IN | OXYGEN SATURATION: 95 % | SYSTOLIC BLOOD PRESSURE: 118 MMHG | WEIGHT: 141.65 LBS | TEMPERATURE: 97 F | BODY MASS INDEX: 25.1 KG/M2 | HEART RATE: 70 BPM

## 2024-11-19 DIAGNOSIS — Z00.00 PE (PHYSICAL EXAM), ANNUAL: ICD-10-CM

## 2024-11-19 DIAGNOSIS — R73.03 PREDIABETES: ICD-10-CM

## 2024-11-19 DIAGNOSIS — M79.7 FIBROMYALGIA: ICD-10-CM

## 2024-11-19 DIAGNOSIS — G47.62 NOCTURNAL LEG CRAMPS: ICD-10-CM

## 2024-11-19 DIAGNOSIS — E78.00 PURE HYPERCHOLESTEROLEMIA: ICD-10-CM

## 2024-11-19 DIAGNOSIS — Z23 NEED FOR INFLUENZA VACCINATION: ICD-10-CM

## 2024-11-19 DIAGNOSIS — J02.9 SORE THROAT: ICD-10-CM

## 2024-11-19 LAB
FLUAV RNA SPEC QL NAA+PROBE: NEGATIVE
FLUBV RNA SPEC QL NAA+PROBE: NEGATIVE
RSV RNA SPEC QL NAA+PROBE: NEGATIVE
S PYO DNA SPEC NAA+PROBE: NOT DETECTED
SARS-COV-2 RNA RESP QL NAA+PROBE: NEGATIVE

## 2024-11-19 PROCEDURE — 99396 PREV VISIT EST AGE 40-64: CPT | Mod: 25 | Performed by: FAMILY MEDICINE

## 2024-11-19 PROCEDURE — 99214 OFFICE O/P EST MOD 30 MIN: CPT | Mod: 25 | Performed by: FAMILY MEDICINE

## 2024-11-19 PROCEDURE — 3074F SYST BP LT 130 MM HG: CPT | Performed by: FAMILY MEDICINE

## 2024-11-19 PROCEDURE — 0241U POCT CEPHEID COV-2, FLU A/B, RSV - PCR: CPT | Performed by: FAMILY MEDICINE

## 2024-11-19 PROCEDURE — 90471 IMMUNIZATION ADMIN: CPT | Performed by: FAMILY MEDICINE

## 2024-11-19 PROCEDURE — 90656 IIV3 VACC NO PRSV 0.5 ML IM: CPT | Performed by: FAMILY MEDICINE

## 2024-11-19 PROCEDURE — 3078F DIAST BP <80 MM HG: CPT | Performed by: FAMILY MEDICINE

## 2024-11-19 PROCEDURE — 87651 STREP A DNA AMP PROBE: CPT | Performed by: FAMILY MEDICINE

## 2024-11-19 RX ORDER — METFORMIN HYDROCHLORIDE 500 MG/1
500 TABLET, EXTENDED RELEASE ORAL DAILY
Qty: 90 TABLET | Refills: 3 | Status: SHIPPED | OUTPATIENT
Start: 2024-11-19

## 2024-11-19 RX ORDER — GABAPENTIN 100 MG/1
100 CAPSULE ORAL
Qty: 90 CAPSULE | Refills: 3 | Status: SHIPPED | OUTPATIENT
Start: 2024-11-19

## 2024-11-19 RX ORDER — ROSUVASTATIN CALCIUM 10 MG/1
10 TABLET, COATED ORAL EVERY EVENING
Qty: 90 TABLET | Refills: 3 | Status: SHIPPED | OUTPATIENT
Start: 2024-11-19

## 2024-11-19 ASSESSMENT — FIBROSIS 4 INDEX: FIB4 SCORE: 1.16

## 2024-11-20 PROBLEM — N39.3 STRESS INCONTINENCE, FEMALE: Status: RESOLVED | Noted: 2022-12-05 | Resolved: 2024-11-20

## 2024-11-20 NOTE — PROGRESS NOTES
Verbal consent was acquired by the patient to use [employer] ambient listening note generation during this visit: YES    CC: annual PE, sore throat, leg cramps     Assessment & Plan:     1. Pure hypercholesterolemia  Chronic, controlled with CRestor 10 mg qd, no s/e reported, will continue.    - rosuvastatin (CRESTOR) 10 MG Tab; Take 1 Tablet by mouth every evening.  Dispense: 90 Tablet; Refill: 3  - Lipid Profile; Future    2. Fibromyalgia  Doing well, does not receive any treatment.   Will continue to monitor.     3. Need for influenza vaccination  - INFLUENZA VACCINE TRI INJ (PF)    4. PE (physical exam), annual  Labs per orders  Immunization reviewed and discussed.  Patient was counseled about  diet, supplements, exercises.   Preventive cares reviewed, discussed, and updated as appropriate.     - CBC WITH DIFFERENTIAL; Future  - Comp Metabolic Panel; Future  - HEMOGLOBIN A1C; Future  - Lipid Profile; Future    5. Nocturnal leg cramps  - leg stretching exercises   - hydration  - over-the-counter Magnesium supplement   - gabapentin (NEURONTIN) 100 MG Cap; Take 1 Capsule by mouth at bedtime.  Dispense: 90 Capsule; Refill: 3    6. Sore throat  Likely viral, POCT testings (as below) were negative   - conservative management recommended and discussed.   - POCT CEPHEID COV-2, FLU A/B, RSV - PCR  - POCT CEPHEID GROUP A STREP - PCR    7. Prediabetes  Chronic, worsening prediabetes. Most recent A1C was 6.4.   Positive family history of type 2 diabetes.   - metFORMIN ER (GLUCOPHAGE XR) 500 MG TABLET SR 24 HR; Take 1 Tablet by mouth every day.  Dispense: 90 Tablet; Refill: 3  - HEMOGLOBIN A1C; Future  - Dietary/lifestyle modification and weight loss    - follow up in 6 months              Subjective:       HPI:   History of Present Illness  The patient presents for evaluation of multiple medical concerns.    She is taking all medications as directed. No side effects reported.     Her A1C continues to get worse. Positive  "family history of diabetes.     She complains of acute development of sore throat with mild dry cough for a few days. Denies fever, chills, SOB, [place], rhinorrhea, ear symptoms. Negative history of recent travel. She otherwise feels ok.     She continues to experience nocturnal leg cramp despite adequate hydration. No recent changes in diet or medications. No recent changes in physical activity and exercises.       Current Outpatient Medications:     rosuvastatin (CRESTOR) 10 MG Tab, Take 1 Tablet by mouth every evening., Disp: 90 Tablet, Rfl: 3    gabapentin (NEURONTIN) 100 MG Cap, Take 1 Capsule by mouth at bedtime., Disp: 90 Capsule, Rfl: 3    metFORMIN ER (GLUCOPHAGE XR) 500 MG TABLET SR 24 HR, Take 1 Tablet by mouth every day., Disp: 90 Tablet, Rfl: 3    estradiol (YUVAFEM) 10 MCG Tab, Insert 1 Tablet into the vagina two times a week., Disp: 24 Tablet, Rfl: 3    tizanidine (ZANAFLEX) 4 MG Tab, Take 1 Tablet by mouth at bedtime., Disp: 90 Tablet, Rfl: 1     Objective:     Exam:  /72 (BP Location: Right arm, Patient Position: Sitting, BP Cuff Size: Adult long)   Pulse 70   Temp 36.1 °C (97 °F) (Temporal)   Ht 1.6 m (5' 3\")   Wt 64.3 kg (141 lb 10.3 oz)   SpO2 95%   BMI 25.09 kg/m²  Body mass index is 25.09 kg/m².    Constitutional: awake, alert, in no distress.  Skin: Warm, dry, good turgor, no rashes, bruises, ulcers in visible areas.  Eye: conjunctiva clear, lids neg for edema or lesions.  ENMT: TM and auditory canals wnl. Oral and nasal mucosa wnl. Lips without lesions, good dentition, mild erythema of posterior oropharynx.  Neck: Trachea midline, no masses, no thyromegaly. No cervical or supraclavicular lymphadenopathy  Respiratory: Unlabored respiratory effort, lungs clear to auscultation, no wheezes, no rales.  Cardiovascular: Normal S1, S2, no murmur, no pedal edema.  Psych: Oriented x3, affect and mood wnl, intact judgement and insight.         Return in about 6 months (around 5/19/2025) for " Multiple issues.          Please note that this dictation was created using voice recognition software. I have made every reasonable attempt to correct obvious errors, but I expect that there are errors of grammar and possibly content that I did not discover before finalizing the note.